# Patient Record
Sex: FEMALE | Race: WHITE | NOT HISPANIC OR LATINO | Employment: OTHER | URBAN - METROPOLITAN AREA
[De-identification: names, ages, dates, MRNs, and addresses within clinical notes are randomized per-mention and may not be internally consistent; named-entity substitution may affect disease eponyms.]

---

## 2023-06-22 ENCOUNTER — APPOINTMENT (OUTPATIENT)
Dept: PRIMARY CARE | Facility: CLINIC | Age: 83
End: 2023-06-22
Payer: COMMERCIAL

## 2023-07-13 ENCOUNTER — OFFICE VISIT (OUTPATIENT)
Dept: PRIMARY CARE | Facility: CLINIC | Age: 83
End: 2023-07-13
Payer: COMMERCIAL

## 2023-07-13 VITALS
HEIGHT: 61 IN | TEMPERATURE: 98 F | OXYGEN SATURATION: 96 % | HEART RATE: 84 BPM | DIASTOLIC BLOOD PRESSURE: 70 MMHG | BODY MASS INDEX: 45.31 KG/M2 | WEIGHT: 240 LBS | SYSTOLIC BLOOD PRESSURE: 115 MMHG

## 2023-07-13 DIAGNOSIS — I10 PRIMARY HYPERTENSION: ICD-10-CM

## 2023-07-13 DIAGNOSIS — E11.610 TYPE 2 DIABETES MELLITUS WITH DIABETIC NEUROPATHIC ARTHROPATHY, WITHOUT LONG-TERM CURRENT USE OF INSULIN (MULTI): ICD-10-CM

## 2023-07-13 DIAGNOSIS — E78.49 OTHER HYPERLIPIDEMIA: ICD-10-CM

## 2023-07-13 DIAGNOSIS — M19.90 ARTHRITIS: Primary | ICD-10-CM

## 2023-07-13 PROCEDURE — 99204 OFFICE O/P NEW MOD 45 MIN: CPT | Performed by: FAMILY MEDICINE

## 2023-07-13 PROCEDURE — 3074F SYST BP LT 130 MM HG: CPT | Performed by: FAMILY MEDICINE

## 2023-07-13 PROCEDURE — 1160F RVW MEDS BY RX/DR IN RCRD: CPT | Performed by: FAMILY MEDICINE

## 2023-07-13 PROCEDURE — 3078F DIAST BP <80 MM HG: CPT | Performed by: FAMILY MEDICINE

## 2023-07-13 PROCEDURE — 1036F TOBACCO NON-USER: CPT | Performed by: FAMILY MEDICINE

## 2023-07-13 PROCEDURE — 1159F MED LIST DOCD IN RCRD: CPT | Performed by: FAMILY MEDICINE

## 2023-07-13 RX ORDER — METOPROLOL SUCCINATE 100 MG/1
100 TABLET, EXTENDED RELEASE ORAL DAILY
Qty: 90 TABLET | Refills: 0 | Status: SHIPPED | OUTPATIENT
Start: 2023-07-13 | End: 2023-10-26

## 2023-07-13 RX ORDER — METOPROLOL SUCCINATE 100 MG/1
1 TABLET, EXTENDED RELEASE ORAL DAILY
COMMUNITY
Start: 2021-02-22 | End: 2023-07-13 | Stop reason: SDUPTHER

## 2023-07-13 RX ORDER — MIRTAZAPINE 15 MG/1
15 TABLET, FILM COATED ORAL NIGHTLY
Qty: 90 TABLET | Refills: 1 | Status: SHIPPED | OUTPATIENT
Start: 2023-07-13 | End: 2024-02-09 | Stop reason: SDUPTHER

## 2023-07-13 RX ORDER — MIRTAZAPINE 15 MG/1
TABLET, FILM COATED ORAL
COMMUNITY
Start: 2022-04-14 | End: 2023-07-13 | Stop reason: SDUPTHER

## 2023-07-13 RX ORDER — ENALAPRIL MALEATE AND HYDROCHLOROTHIAZIDE 10; 25 MG/1; MG/1
1 TABLET ORAL DAILY
COMMUNITY
End: 2023-07-13 | Stop reason: SDUPTHER

## 2023-07-13 RX ORDER — METFORMIN HYDROCHLORIDE 500 MG/1
TABLET ORAL
COMMUNITY
Start: 2016-02-25 | End: 2023-07-13 | Stop reason: SDUPTHER

## 2023-07-13 RX ORDER — METFORMIN HYDROCHLORIDE 500 MG/1
500 TABLET ORAL
Qty: 180 TABLET | Refills: 1 | Status: SHIPPED | OUTPATIENT
Start: 2023-07-13 | End: 2023-11-09 | Stop reason: ALTCHOICE

## 2023-07-13 RX ORDER — ENALAPRIL MALEATE AND HYDROCHLOROTHIAZIDE 10; 25 MG/1; MG/1
1 TABLET ORAL DAILY
Qty: 90 TABLET | Refills: 0 | Status: SHIPPED | OUTPATIENT
Start: 2023-07-13 | End: 2024-02-09 | Stop reason: WASHOUT

## 2023-07-13 RX ORDER — NAPROXEN 500 MG/1
500 TABLET ORAL DAILY
COMMUNITY
Start: 2023-04-25 | End: 2023-07-13 | Stop reason: SDUPTHER

## 2023-07-13 RX ORDER — ROSUVASTATIN CALCIUM 40 MG/1
40 TABLET, COATED ORAL DAILY
Qty: 90 TABLET | Refills: 1 | Status: SHIPPED | OUTPATIENT
Start: 2023-07-13 | End: 2023-11-09 | Stop reason: SDUPTHER

## 2023-07-13 RX ORDER — SERTRALINE HYDROCHLORIDE 50 MG/1
TABLET, FILM COATED ORAL
COMMUNITY
Start: 2022-04-14 | End: 2024-02-09 | Stop reason: SDUPTHER

## 2023-07-13 RX ORDER — ROSUVASTATIN CALCIUM 40 MG/1
40 TABLET, COATED ORAL DAILY
COMMUNITY
Start: 2023-04-25 | End: 2023-07-13 | Stop reason: SDUPTHER

## 2023-07-13 RX ORDER — NAPROXEN 500 MG/1
500 TABLET ORAL DAILY
Qty: 180 TABLET | Refills: 1 | Status: SHIPPED | OUTPATIENT
Start: 2023-07-13 | End: 2023-11-09 | Stop reason: ALTCHOICE

## 2023-07-13 ASSESSMENT — PATIENT HEALTH QUESTIONNAIRE - PHQ9
SUM OF ALL RESPONSES TO PHQ9 QUESTIONS 1 AND 2: 2
1. LITTLE INTEREST OR PLEASURE IN DOING THINGS: SEVERAL DAYS
2. FEELING DOWN, DEPRESSED OR HOPELESS: SEVERAL DAYS

## 2023-07-13 NOTE — PROGRESS NOTES
"Subjective   Patient ID: Vivienne Li is a 82 y.o. female who presents for New Patient Visit (New pt is here to establish care).    Needs meds refilled and incontinence supplies rx         Review of Systems   Reason unable to perform ROS: debility.       Objective   /70   Pulse 84   Temp 36.7 °C (98 °F)   Ht 1.549 m (5' 1\")   Wt 109 kg (240 lb)   SpO2 96%   BMI 45.35 kg/m²     Physical Exam  Vitals and nursing note reviewed.   Constitutional:       Appearance: She is obese.   HENT:      Head: Normocephalic and atraumatic.      Nose: Nose normal.      Mouth/Throat:      Mouth: Mucous membranes are moist.      Pharynx: Oropharynx is clear.   Eyes:      Extraocular Movements: Extraocular movements intact.      Conjunctiva/sclera: Conjunctivae normal.      Pupils: Pupils are equal, round, and reactive to light.   Cardiovascular:      Rate and Rhythm: Normal rate and regular rhythm.      Pulses: Normal pulses.   Pulmonary:      Effort: Pulmonary effort is normal.      Breath sounds: Normal breath sounds.   Abdominal:      General: Bowel sounds are normal.      Palpations: Abdomen is soft.   Musculoskeletal:         General: Tenderness present. Normal range of motion.      Cervical back: Normal range of motion and neck supple.   Skin:     General: Skin is warm and dry.      Capillary Refill: Capillary refill takes less than 2 seconds.      Coloration: Skin is pale.   Neurological:      Mental Status: She is alert. Mental status is at baseline.      Motor: Weakness present.      Gait: Gait abnormal.   Psychiatric:         Mood and Affect: Mood normal.         Behavior: Behavior normal.         Thought Content: Thought content normal.         Judgment: Judgment normal.         Assessment/Plan          "

## 2023-08-18 ENCOUNTER — TELEPHONE (OUTPATIENT)
Dept: PRIMARY CARE | Facility: CLINIC | Age: 83
End: 2023-08-18
Payer: COMMERCIAL

## 2023-08-18 NOTE — TELEPHONE ENCOUNTER
Spoke with  (mobile # listed) and she is asking what can be done about arthritis in knees. Pt has difficulty getting around and  is requesting either a cortisone shot or Prednisone prescription. Please advise.

## 2023-08-25 ENCOUNTER — OFFICE VISIT (OUTPATIENT)
Dept: PRIMARY CARE | Facility: CLINIC | Age: 83
End: 2023-08-25
Payer: COMMERCIAL

## 2023-08-25 VITALS
HEIGHT: 61 IN | DIASTOLIC BLOOD PRESSURE: 73 MMHG | OXYGEN SATURATION: 93 % | HEART RATE: 89 BPM | SYSTOLIC BLOOD PRESSURE: 115 MMHG | TEMPERATURE: 96.7 F | BODY MASS INDEX: 45.35 KG/M2

## 2023-08-25 DIAGNOSIS — Z79.4 TYPE 2 DIABETES MELLITUS WITH OTHER SPECIFIED COMPLICATION, WITH LONG-TERM CURRENT USE OF INSULIN (MULTI): Primary | ICD-10-CM

## 2023-08-25 DIAGNOSIS — R32 INCONTINENCE IN FEMALE: ICD-10-CM

## 2023-08-25 DIAGNOSIS — M17.0 LOCALIZED OSTEOARTHRITIS OF BOTH KNEES: ICD-10-CM

## 2023-08-25 DIAGNOSIS — E11.69 TYPE 2 DIABETES MELLITUS WITH OTHER SPECIFIED COMPLICATION, WITH LONG-TERM CURRENT USE OF INSULIN (MULTI): Primary | ICD-10-CM

## 2023-08-25 PROCEDURE — 1036F TOBACCO NON-USER: CPT | Performed by: FAMILY MEDICINE

## 2023-08-25 PROCEDURE — 1159F MED LIST DOCD IN RCRD: CPT | Performed by: FAMILY MEDICINE

## 2023-08-25 PROCEDURE — 3078F DIAST BP <80 MM HG: CPT | Performed by: FAMILY MEDICINE

## 2023-08-25 PROCEDURE — 3074F SYST BP LT 130 MM HG: CPT | Performed by: FAMILY MEDICINE

## 2023-08-25 PROCEDURE — 99214 OFFICE O/P EST MOD 30 MIN: CPT | Performed by: FAMILY MEDICINE

## 2023-08-25 PROCEDURE — 1160F RVW MEDS BY RX/DR IN RCRD: CPT | Performed by: FAMILY MEDICINE

## 2023-08-25 RX ORDER — METHYLPREDNISOLONE 4 MG/1
TABLET ORAL
Qty: 21 TABLET | Refills: 0 | Status: SHIPPED | OUTPATIENT
Start: 2023-08-25 | End: 2023-09-01

## 2023-08-25 ASSESSMENT — PATIENT HEALTH QUESTIONNAIRE - PHQ9
1. LITTLE INTEREST OR PLEASURE IN DOING THINGS: NOT AT ALL
SUM OF ALL RESPONSES TO PHQ9 QUESTIONS 1 & 2: 0
2. FEELING DOWN, DEPRESSED OR HOPELESS: NOT AT ALL

## 2023-08-25 ASSESSMENT — ENCOUNTER SYMPTOMS: ARTHRALGIAS: 1

## 2023-08-25 ASSESSMENT — COLUMBIA-SUICIDE SEVERITY RATING SCALE - C-SSRS
2. HAVE YOU ACTUALLY HAD ANY THOUGHTS OF KILLING YOURSELF?: NO
1. IN THE PAST MONTH, HAVE YOU WISHED YOU WERE DEAD OR WISHED YOU COULD GO TO SLEEP AND NOT WAKE UP?: NO
6. HAVE YOU EVER DONE ANYTHING, STARTED TO DO ANYTHING, OR PREPARED TO DO ANYTHING TO END YOUR LIFE?: NO

## 2023-08-25 NOTE — PROGRESS NOTES
"Subjective   Patient ID: Vivienne Li is a 82 y.o. female who presents for Knee Pain (Bilateral knee pain x chronic pain ).    Knee pain worse. End stage oa, diabetic, not a tkr candidate, WANTS PREDNISONE. REFERRED TO ORTHO AND ALSO TO ENDO FOR DM2 AND OBESITY TO EVAL FOR OZEMPIC.    Knee Pain          Review of Systems   Musculoskeletal:  Positive for arthralgias.   All other systems reviewed and are negative.      Objective   /73   Pulse 89   Temp 35.9 °C (96.7 °F)   Ht 1.549 m (5' 1\")   SpO2 93%   BMI 45.35 kg/m²     Physical Exam  Vitals and nursing note reviewed.   Constitutional:       Appearance: She is obese. She is ill-appearing.   HENT:      Head: Normocephalic and atraumatic.      Nose: Nose normal.      Mouth/Throat:      Mouth: Mucous membranes are moist.      Pharynx: Oropharynx is clear.   Eyes:      Extraocular Movements: Extraocular movements intact.      Conjunctiva/sclera: Conjunctivae normal.      Pupils: Pupils are equal, round, and reactive to light.   Cardiovascular:      Rate and Rhythm: Normal rate and regular rhythm.      Pulses: Normal pulses.   Pulmonary:      Effort: Pulmonary effort is normal.      Breath sounds: Normal breath sounds.   Abdominal:      General: Bowel sounds are normal.      Palpations: Abdomen is soft.   Musculoskeletal:         General: Tenderness present. Normal range of motion.      Cervical back: Normal range of motion and neck supple.   Skin:     General: Skin is warm and dry.      Capillary Refill: Capillary refill takes less than 2 seconds.   Neurological:      General: No focal deficit present.      Mental Status: She is alert.      Motor: Weakness present.      Gait: Gait abnormal.   Psychiatric:         Mood and Affect: Mood normal.         Behavior: Behavior normal.         Thought Content: Thought content normal.         Judgment: Judgment normal.         Assessment/Plan          "

## 2023-11-08 PROBLEM — H91.90 SUBJECTIVE HEARING CHANGE: Status: ACTIVE | Noted: 2023-11-08

## 2023-11-08 PROBLEM — I10 HTN (HYPERTENSION): Status: ACTIVE | Noted: 2023-11-08

## 2023-11-08 RX ORDER — NEOMYCIN SULFATE, POLYMYXIN B SULFATE AND HYDROCORTISONE 10; 3.5; 1 MG/ML; MG/ML; [USP'U]/ML
3 SUSPENSION/ DROPS AURICULAR (OTIC) 3 TIMES DAILY
COMMUNITY
Start: 2022-09-01 | End: 2024-02-09 | Stop reason: WASHOUT

## 2023-11-08 RX ORDER — LISINOPRIL AND HYDROCHLOROTHIAZIDE 12.5; 2 MG/1; MG/1
2 TABLET ORAL DAILY
COMMUNITY
Start: 2017-01-26 | End: 2023-11-09 | Stop reason: SDUPTHER

## 2023-11-08 RX ORDER — MECLIZINE HCL 12.5 MG 12.5 MG/1
25 TABLET ORAL 3 TIMES DAILY
COMMUNITY
Start: 2017-05-05 | End: 2024-02-09 | Stop reason: SDUPTHER

## 2023-11-08 RX ORDER — LISINOPRIL 40 MG/1
40 TABLET ORAL
COMMUNITY
Start: 2016-02-25 | End: 2024-02-09 | Stop reason: SDUPTHER

## 2023-11-08 RX ORDER — ROSUVASTATIN CALCIUM 20 MG/1
20 TABLET, COATED ORAL
COMMUNITY
Start: 2016-02-25 | End: 2023-11-09 | Stop reason: ALTCHOICE

## 2023-11-08 RX ORDER — METFORMIN HYDROCHLORIDE 1000 MG/1
1000 TABLET ORAL 2 TIMES DAILY
COMMUNITY
End: 2023-11-09 | Stop reason: SDUPTHER

## 2023-11-08 RX ORDER — TALC
3 POWDER (GRAM) TOPICAL
COMMUNITY
End: 2024-02-09 | Stop reason: SDUPTHER

## 2023-11-08 RX ORDER — AMLODIPINE BESYLATE 10 MG/1
10 TABLET ORAL
COMMUNITY
Start: 2016-12-30 | End: 2024-02-09 | Stop reason: SDUPTHER

## 2023-11-08 RX ORDER — HYDROCHLOROTHIAZIDE 25 MG/1
25 TABLET ORAL
COMMUNITY
Start: 2016-06-07

## 2023-11-08 RX ORDER — NAPROXEN 375 MG/1
1 TABLET ORAL 2 TIMES DAILY PRN
COMMUNITY
Start: 2016-06-07 | End: 2024-02-09 | Stop reason: SDUPTHER

## 2023-11-08 RX ORDER — HYDROCODONE BITARTRATE AND ACETAMINOPHEN 5; 325 MG/1; MG/1
1 TABLET ORAL EVERY 6 HOURS PRN
COMMUNITY
Start: 2016-11-29

## 2023-11-09 ENCOUNTER — TELEMEDICINE (OUTPATIENT)
Dept: PRIMARY CARE | Facility: CLINIC | Age: 83
End: 2023-11-09
Payer: COMMERCIAL

## 2023-11-09 DIAGNOSIS — K59.00 CONSTIPATION, UNSPECIFIED CONSTIPATION TYPE: Primary | ICD-10-CM

## 2023-11-09 DIAGNOSIS — E78.49 OTHER HYPERLIPIDEMIA: ICD-10-CM

## 2023-11-09 DIAGNOSIS — E11.43 TYPE 2 DIABETES MELLITUS WITH DIABETIC AUTONOMIC NEUROPATHY, WITHOUT LONG-TERM CURRENT USE OF INSULIN (MULTI): ICD-10-CM

## 2023-11-09 DIAGNOSIS — I10 PRIMARY HYPERTENSION: ICD-10-CM

## 2023-11-09 DIAGNOSIS — M19.90 ARTHRITIS: ICD-10-CM

## 2023-11-09 PROCEDURE — 99214 OFFICE O/P EST MOD 30 MIN: CPT | Performed by: FAMILY MEDICINE

## 2023-11-09 RX ORDER — METOPROLOL SUCCINATE 100 MG/1
100 TABLET, EXTENDED RELEASE ORAL DAILY
Qty: 90 TABLET | Refills: 0 | Status: SHIPPED | OUTPATIENT
Start: 2023-11-09 | End: 2024-02-09 | Stop reason: SDUPTHER

## 2023-11-09 RX ORDER — POLYETHYLENE GLYCOL 3350 17 G/17G
8.5 POWDER, FOR SOLUTION ORAL DAILY
Qty: 45 PACKET | Refills: 1 | Status: SHIPPED | OUTPATIENT
Start: 2023-11-09 | End: 2024-02-09 | Stop reason: SDUPTHER

## 2023-11-09 RX ORDER — ROSUVASTATIN CALCIUM 40 MG/1
40 TABLET, COATED ORAL DAILY
Qty: 90 TABLET | Refills: 1 | Status: SHIPPED | OUTPATIENT
Start: 2023-11-09 | End: 2024-02-09 | Stop reason: SDUPTHER

## 2023-11-09 RX ORDER — LISINOPRIL AND HYDROCHLOROTHIAZIDE 12.5; 2 MG/1; MG/1
2 TABLET ORAL DAILY
Qty: 180 TABLET | Refills: 1 | Status: SHIPPED | OUTPATIENT
Start: 2023-11-09 | End: 2024-02-09 | Stop reason: SDUPTHER

## 2023-11-09 RX ORDER — METFORMIN HYDROCHLORIDE 1000 MG/1
1000 TABLET ORAL 2 TIMES DAILY
Qty: 180 TABLET | Refills: 1 | Status: SHIPPED | OUTPATIENT
Start: 2023-11-09 | End: 2024-02-09 | Stop reason: SDUPTHER

## 2023-11-09 NOTE — PROGRESS NOTES
Subjective   Patient ID: Vivienne Li is a 83 y.o. female who presents for No chief complaint on file..    Virtual visit. Constipation, requests miralax, htn, dm2, hld,  needs meds refilled.  Arthritis worse, needs higher naprosyn dose.         Review of Systems   Reason unable to perform ROS: debiliy.       Objective   There were no vitals taken for this visit.    Physical Exam    Assessment/Plan

## 2024-02-09 ENCOUNTER — TELEMEDICINE (OUTPATIENT)
Dept: PRIMARY CARE | Facility: CLINIC | Age: 84
End: 2024-02-09
Payer: COMMERCIAL

## 2024-02-09 ENCOUNTER — TELEPHONE (OUTPATIENT)
Dept: PRIMARY CARE | Facility: CLINIC | Age: 84
End: 2024-02-09

## 2024-02-09 DIAGNOSIS — E78.49 OTHER HYPERLIPIDEMIA: ICD-10-CM

## 2024-02-09 DIAGNOSIS — K59.00 CONSTIPATION, UNSPECIFIED CONSTIPATION TYPE: ICD-10-CM

## 2024-02-09 DIAGNOSIS — E11.610 TYPE 2 DIABETES MELLITUS WITH DIABETIC NEUROPATHIC ARTHROPATHY, WITHOUT LONG-TERM CURRENT USE OF INSULIN (MULTI): ICD-10-CM

## 2024-02-09 DIAGNOSIS — E11.43 TYPE 2 DIABETES MELLITUS WITH DIABETIC AUTONOMIC NEUROPATHY, WITHOUT LONG-TERM CURRENT USE OF INSULIN (MULTI): ICD-10-CM

## 2024-02-09 DIAGNOSIS — M19.90 ARTHRITIS: Primary | ICD-10-CM

## 2024-02-09 DIAGNOSIS — I10 PRIMARY HYPERTENSION: ICD-10-CM

## 2024-02-09 DIAGNOSIS — R32 INCONTINENCE IN FEMALE: ICD-10-CM

## 2024-02-09 PROCEDURE — 99214 OFFICE O/P EST MOD 30 MIN: CPT | Performed by: FAMILY MEDICINE

## 2024-02-09 PROCEDURE — 1036F TOBACCO NON-USER: CPT | Performed by: FAMILY MEDICINE

## 2024-02-09 RX ORDER — LISINOPRIL AND HYDROCHLOROTHIAZIDE 12.5; 2 MG/1; MG/1
2 TABLET ORAL DAILY
Qty: 180 TABLET | Refills: 3 | Status: SHIPPED | OUTPATIENT
Start: 2024-02-09 | End: 2025-02-03

## 2024-02-09 RX ORDER — SERTRALINE HYDROCHLORIDE 50 MG/1
50 TABLET, FILM COATED ORAL DAILY
Qty: 90 TABLET | Refills: 3 | Status: SHIPPED | OUTPATIENT
Start: 2024-02-09 | End: 2025-02-03

## 2024-02-09 RX ORDER — PREDNISONE 10 MG/1
TABLET ORAL
Qty: 42 TABLET | Refills: 0 | Status: SHIPPED | OUTPATIENT
Start: 2024-02-09 | End: 2024-02-21

## 2024-02-09 RX ORDER — AMLODIPINE BESYLATE 10 MG/1
10 TABLET ORAL
Qty: 90 TABLET | Refills: 3 | Status: SHIPPED | OUTPATIENT
Start: 2024-02-09 | End: 2024-02-09 | Stop reason: SDUPTHER

## 2024-02-09 RX ORDER — NAPROXEN 375 MG/1
375 TABLET ORAL 2 TIMES DAILY PRN
Qty: 180 TABLET | Refills: 3 | Status: SHIPPED | OUTPATIENT
Start: 2024-02-09 | End: 2025-02-03

## 2024-02-09 RX ORDER — MIRTAZAPINE 15 MG/1
15 TABLET, FILM COATED ORAL NIGHTLY
Qty: 90 TABLET | Refills: 1 | Status: SHIPPED | OUTPATIENT
Start: 2024-02-09 | End: 2024-08-07

## 2024-02-09 RX ORDER — METFORMIN HYDROCHLORIDE 1000 MG/1
1000 TABLET ORAL 2 TIMES DAILY
Qty: 180 TABLET | Refills: 3 | Status: SHIPPED | OUTPATIENT
Start: 2024-02-09 | End: 2025-02-03

## 2024-02-09 RX ORDER — LISINOPRIL 40 MG/1
40 TABLET ORAL
Qty: 90 TABLET | Refills: 3 | Status: SHIPPED | OUTPATIENT
Start: 2024-02-09 | End: 2025-02-03

## 2024-02-09 RX ORDER — ROSUVASTATIN CALCIUM 40 MG/1
40 TABLET, COATED ORAL DAILY
Qty: 90 TABLET | Refills: 3 | Status: SHIPPED | OUTPATIENT
Start: 2024-02-09 | End: 2025-02-03

## 2024-02-09 RX ORDER — AMLODIPINE BESYLATE 10 MG/1
10 TABLET ORAL
Qty: 90 TABLET | Refills: 3 | Status: SHIPPED | OUTPATIENT
Start: 2024-02-09 | End: 2025-02-03

## 2024-02-09 RX ORDER — MECLIZINE HCL 12.5 MG 12.5 MG/1
25 TABLET ORAL 3 TIMES DAILY PRN
Qty: 270 TABLET | Refills: 3 | Status: SHIPPED | OUTPATIENT
Start: 2024-02-09 | End: 2025-02-03

## 2024-02-09 RX ORDER — TALC
3 POWDER (GRAM) TOPICAL NIGHTLY PRN
Qty: 90 TABLET | Refills: 3 | Status: SHIPPED | OUTPATIENT
Start: 2024-02-09 | End: 2025-02-03

## 2024-02-09 RX ORDER — METOPROLOL SUCCINATE 100 MG/1
100 TABLET, EXTENDED RELEASE ORAL DAILY
Qty: 90 TABLET | Refills: 3 | Status: SHIPPED | OUTPATIENT
Start: 2024-02-09 | End: 2025-02-03

## 2024-02-09 RX ORDER — POLYETHYLENE GLYCOL 3350 17 G/17G
8.5 POWDER, FOR SOLUTION ORAL DAILY
Qty: 45 PACKET | Refills: 1 | Status: SHIPPED | OUTPATIENT
Start: 2024-02-09 | End: 2024-08-07

## 2024-02-09 ASSESSMENT — ENCOUNTER SYMPTOMS
NECK STIFFNESS: 1
BACK PAIN: 1
ARTHRALGIAS: 1
MYALGIAS: 1
NECK PAIN: 1
JOINT SWELLING: 1

## 2024-02-09 NOTE — PROGRESS NOTES
Subjective   Patient ID: Vivienne Li is a 83 y.o. female who presents for Follow-up (3 months-virtual visit).    Virtual visit. Here for meds refill. Arthritis acting up, requests prednisone taper pack.         Review of Systems   Musculoskeletal:  Positive for arthralgias, back pain, gait problem, joint swelling, myalgias, neck pain and neck stiffness.   All other systems reviewed and are negative.      Objective   There were no vitals taken for this visit.    Physical Exam    Assessment/Plan

## 2024-02-09 NOTE — TELEPHONE ENCOUNTER
Pharmacist called to double check on prescriptions that were sent in today. There is one for Lisinopril 40 MG and also one Lisinopril-Hydrochlorothiazide 20-12.5 MG.

## 2024-02-13 NOTE — TELEPHONE ENCOUNTER
Called and told the pharmacy. They will fill the Lisinopril-Hydrochlorothiazide 20-12.5 MG, but patient had already picked up Lisinopril 40 MG. Called  to tell patient not to take Lisinopril and take the combined medication instead.

## 2024-06-14 ENCOUNTER — TELEMEDICINE (OUTPATIENT)
Dept: PRIMARY CARE | Facility: CLINIC | Age: 84
End: 2024-06-14
Payer: COMMERCIAL

## 2024-06-14 DIAGNOSIS — M19.90 ARTHRITIS: ICD-10-CM

## 2024-06-14 DIAGNOSIS — E78.49 OTHER HYPERLIPIDEMIA: ICD-10-CM

## 2024-06-14 DIAGNOSIS — I10 PRIMARY HYPERTENSION: Primary | ICD-10-CM

## 2024-06-14 DIAGNOSIS — E11.43 TYPE 2 DIABETES MELLITUS WITH DIABETIC AUTONOMIC NEUROPATHY, WITHOUT LONG-TERM CURRENT USE OF INSULIN (MULTI): ICD-10-CM

## 2024-06-14 DIAGNOSIS — E11.610 TYPE 2 DIABETES MELLITUS WITH DIABETIC NEUROPATHIC ARTHROPATHY, WITHOUT LONG-TERM CURRENT USE OF INSULIN (MULTI): ICD-10-CM

## 2024-06-14 PROCEDURE — 99214 OFFICE O/P EST MOD 30 MIN: CPT | Performed by: FAMILY MEDICINE

## 2024-06-14 RX ORDER — MECLIZINE HCL 12.5 MG 12.5 MG/1
25 TABLET ORAL 3 TIMES DAILY PRN
Qty: 270 TABLET | Refills: 3 | Status: ON HOLD | OUTPATIENT
Start: 2024-06-14 | End: 2025-06-09

## 2024-06-14 RX ORDER — METOPROLOL SUCCINATE 100 MG/1
100 TABLET, EXTENDED RELEASE ORAL DAILY
Qty: 90 TABLET | Refills: 3 | Status: ON HOLD | OUTPATIENT
Start: 2024-06-14 | End: 2025-06-09

## 2024-06-14 RX ORDER — MIRTAZAPINE 15 MG/1
15 TABLET, FILM COATED ORAL NIGHTLY
Qty: 90 TABLET | Refills: 1 | Status: ON HOLD | OUTPATIENT
Start: 2024-06-14 | End: 2024-12-11

## 2024-06-14 RX ORDER — NAPROXEN 375 MG/1
375 TABLET ORAL 2 TIMES DAILY PRN
Qty: 180 TABLET | Refills: 3 | Status: ON HOLD | OUTPATIENT
Start: 2024-06-14 | End: 2025-06-09

## 2024-06-14 RX ORDER — ROSUVASTATIN CALCIUM 40 MG/1
40 TABLET, COATED ORAL DAILY
Qty: 90 TABLET | Refills: 3 | Status: ON HOLD | OUTPATIENT
Start: 2024-06-14 | End: 2025-06-09

## 2024-06-14 RX ORDER — METHYLPREDNISOLONE 4 MG/1
TABLET ORAL
Qty: 21 TABLET | Refills: 0 | Status: ON HOLD | OUTPATIENT
Start: 2024-06-14 | End: 2024-06-21

## 2024-06-14 RX ORDER — HYDROCHLOROTHIAZIDE 25 MG/1
25 TABLET ORAL
Qty: 90 TABLET | Refills: 3 | Status: ON HOLD | OUTPATIENT
Start: 2024-06-14 | End: 2025-06-09

## 2024-06-14 RX ORDER — METFORMIN HYDROCHLORIDE 1000 MG/1
1000 TABLET ORAL 2 TIMES DAILY
Qty: 180 TABLET | Refills: 3 | Status: ON HOLD | OUTPATIENT
Start: 2024-06-14 | End: 2025-06-09

## 2024-06-14 RX ORDER — HYDROCODONE BITARTRATE AND ACETAMINOPHEN 5; 325 MG/1; MG/1
1 TABLET ORAL EVERY 6 HOURS PRN
Qty: 120 TABLET | Refills: 0 | Status: ON HOLD | OUTPATIENT
Start: 2024-06-14 | End: 2024-07-14

## 2024-06-14 RX ORDER — ENALAPRIL MALEATE 10 MG/1
10 TABLET ORAL DAILY
Qty: 90 TABLET | Refills: 3 | Status: ON HOLD | OUTPATIENT
Start: 2024-06-14 | End: 2025-06-14

## 2024-06-14 NOTE — PROGRESS NOTES
Subjective   Patient ID: Vivienne Li is a 83 y.o. female who presents for Follow-up.    Virtual visit. Arthritis acting up, requests a medrol dose pack. Wants to return to enalapril hydrochlorothiazide for htn as lisinopril isn't controlling her bp. Needs all meds refilled.          Review of Systems   Reason unable to perform ROS: debility.   [place]AR[place]:  Peng Velez MD on 6/14/2024 10:01 AM  I have personally reviewed the OARRS report for Vivienne Li. I have considered the risks of abuse, dependence, addiction and diversion    Is the patient prescribed a combination of a benzodiazepine and opioid?  Yes, I feel it is clincially indicated to continue the medication and have discussed with the patient risks/benefits/alternatives.    Last Urine Drug Screen / ordered today: No  No results found for this or any previous visit (from the past 8760 hour(s)).  N/A    Clinical rationale for not completing a Urine Drug Screen: virtual visit      Controlled Substance Agreement:  Date of the Last Agreement: current  Reviewed Controlled Substance Agreement including but not limited to the benefits, risks, and alternatives to treatment with a Controlled Substance medication(s).    Opioids:  What is the patient's goal of therapy? Pain relief  Is this being achieved with current treatment? yes    I have calculated the patient's Morphine Dose Equivalent (MED):   I have considered referral to Pain Management and/or a specialist, and do not feel it is necessary at this time.    I feel that it is clinically indicated to continue this current medication regimen after consideration of alternative therapies, and other non-opioid treatment.    Opioid Risk Screening:  No data recorded    Pain Assessment:  No data recorded    Objective   There were no vitals taken for this visit.    Physical Exam    Assessment/Plan   Problem List Items Addressed This Visit             ICD-10-CM    HTN (hypertension) - Primary I10    Relevant Medications     metoprolol succinate XL (Toprol-XL) 100 mg 24 hr tablet    hydroCHLOROthiazide (HYDRODiuril) 25 mg tablet    naproxen (Naprosyn) 375 mg tablet    enalapril (Vasotec) 10 mg tablet    Type 2 diabetes mellitus (Multi) E11.9    Relevant Medications    metFORMIN (Glucophage) 1,000 mg tablet    mirtazapine (Remeron) 15 mg tablet    meclizine (Antivert) 12.5 mg tablet     Other Visit Diagnoses         Codes    Other hyperlipidemia     E78.49    Relevant Medications    meclizine (Antivert) 12.5 mg tablet    naproxen (Naprosyn) 375 mg tablet    rosuvastatin (Crestor) 40 mg tablet    Arthritis     M19.90    Relevant Medications    HYDROcodone-acetaminophen (Norco) 5-325 mg tablet    methylPREDNISolone (Medrol Dospak) 4 mg tablets

## 2024-06-16 ENCOUNTER — APPOINTMENT (OUTPATIENT)
Dept: RADIOLOGY | Facility: HOSPITAL | Age: 84
DRG: 378 | End: 2024-06-16
Payer: COMMERCIAL

## 2024-06-16 ENCOUNTER — APPOINTMENT (OUTPATIENT)
Dept: CARDIOLOGY | Facility: HOSPITAL | Age: 84
DRG: 378 | End: 2024-06-16
Payer: COMMERCIAL

## 2024-06-16 ENCOUNTER — HOSPITAL ENCOUNTER (INPATIENT)
Facility: HOSPITAL | Age: 84
DRG: 378 | End: 2024-06-16
Attending: STUDENT IN AN ORGANIZED HEALTH CARE EDUCATION/TRAINING PROGRAM | Admitting: INTERNAL MEDICINE
Payer: COMMERCIAL

## 2024-06-16 VITALS
DIASTOLIC BLOOD PRESSURE: 58 MMHG | TEMPERATURE: 98.1 F | RESPIRATION RATE: 25 BRPM | SYSTOLIC BLOOD PRESSURE: 132 MMHG | HEART RATE: 80 BPM | OXYGEN SATURATION: 97 %

## 2024-06-16 DIAGNOSIS — I95.9 HYPOTENSION, UNSPECIFIED HYPOTENSION TYPE: ICD-10-CM

## 2024-06-16 DIAGNOSIS — R91.1 LUNG NODULE: ICD-10-CM

## 2024-06-16 DIAGNOSIS — K25.9 MULTIPLE GASTRIC ULCERS: ICD-10-CM

## 2024-06-16 DIAGNOSIS — K22.10: ICD-10-CM

## 2024-06-16 DIAGNOSIS — K92.0 HEMATEMESIS, UNSPECIFIED WHETHER NAUSEA PRESENT: Primary | ICD-10-CM

## 2024-06-16 DIAGNOSIS — D50.0 IRON DEFICIENCY ANEMIA DUE TO CHRONIC BLOOD LOSS: ICD-10-CM

## 2024-06-16 DIAGNOSIS — M25.569 ARTHRALGIA OF KNEE, UNSPECIFIED LATERALITY: ICD-10-CM

## 2024-06-16 LAB
ABO GROUP (TYPE) IN BLOOD: NORMAL
ABO GROUP (TYPE) IN BLOOD: NORMAL
ALBUMIN SERPL BCP-MCNC: 3.1 G/DL (ref 3.4–5)
ALP SERPL-CCNC: 47 U/L (ref 33–136)
ALT SERPL W P-5'-P-CCNC: 5 U/L (ref 7–45)
ANION GAP SERPL CALC-SCNC: 12 MMOL/L (ref 10–20)
ANTIBODY SCREEN: NORMAL
APTT PPP: 30 SECONDS (ref 27–38)
AST SERPL W P-5'-P-CCNC: 10 U/L (ref 9–39)
BASOPHILS # BLD AUTO: 0.02 X10*3/UL (ref 0–0.1)
BASOPHILS NFR BLD AUTO: 0.2 %
BILIRUB SERPL-MCNC: 0.2 MG/DL (ref 0–1.2)
BLOOD EXPIRATION DATE: NORMAL
BUN SERPL-MCNC: 83 MG/DL (ref 6–23)
CALCIUM SERPL-MCNC: 8.5 MG/DL (ref 8.6–10.3)
CARDIAC TROPONIN I PNL SERPL HS: 15 NG/L (ref 0–13)
CARDIAC TROPONIN I PNL SERPL HS: 17 NG/L (ref 0–13)
CHLORIDE SERPL-SCNC: 112 MMOL/L (ref 98–107)
CO2 SERPL-SCNC: 20 MMOL/L (ref 21–32)
CREAT SERPL-MCNC: 1.58 MG/DL (ref 0.5–1.05)
DACRYOCYTES BLD QL SMEAR: NORMAL
DISPENSE STATUS: NORMAL
EGFRCR SERPLBLD CKD-EPI 2021: 32 ML/MIN/1.73M*2
EOSINOPHIL # BLD AUTO: 0.25 X10*3/UL (ref 0–0.4)
EOSINOPHIL NFR BLD AUTO: 2.4 %
ERYTHROCYTE [DISTWIDTH] IN BLOOD BY AUTOMATED COUNT: 14.9 % (ref 11.5–14.5)
GLUCOSE BLD MANUAL STRIP-MCNC: 102 MG/DL (ref 74–99)
GLUCOSE BLD MANUAL STRIP-MCNC: 107 MG/DL (ref 74–99)
GLUCOSE SERPL-MCNC: 159 MG/DL (ref 74–99)
HCT VFR BLD AUTO: 14.7 % (ref 36–46)
HGB BLD-MCNC: 4.5 G/DL (ref 12–16)
IMM GRANULOCYTES # BLD AUTO: 0.06 X10*3/UL (ref 0–0.5)
IMM GRANULOCYTES NFR BLD AUTO: 0.6 % (ref 0–0.9)
INR PPP: 1.2 (ref 0.9–1.1)
LIPASE SERPL-CCNC: 37 U/L (ref 9–82)
LYMPHOCYTES # BLD AUTO: 2.29 X10*3/UL (ref 0.8–3)
LYMPHOCYTES NFR BLD AUTO: 22.4 %
MCH RBC QN AUTO: 26.9 PG (ref 26–34)
MCHC RBC AUTO-ENTMCNC: 30.6 G/DL (ref 32–36)
MCV RBC AUTO: 88 FL (ref 80–100)
MONOCYTES # BLD AUTO: 0.84 X10*3/UL (ref 0.05–0.8)
MONOCYTES NFR BLD AUTO: 8.2 %
NEUTROPHILS # BLD AUTO: 6.76 X10*3/UL (ref 1.6–5.5)
NEUTROPHILS NFR BLD AUTO: 66.2 %
NRBC BLD-RTO: 0 /100 WBCS (ref 0–0)
OVALOCYTES BLD QL SMEAR: NORMAL
PLATELET # BLD AUTO: 249 X10*3/UL (ref 150–450)
POLYCHROMASIA BLD QL SMEAR: NORMAL
POTASSIUM SERPL-SCNC: 4.8 MMOL/L (ref 3.5–5.3)
PRODUCT BLOOD TYPE: 5100
PRODUCT CODE: NORMAL
PROT SERPL-MCNC: 5.5 G/DL (ref 6.4–8.2)
PROTHROMBIN TIME: 13 SECONDS (ref 9.8–12.8)
RBC # BLD AUTO: 1.67 X10*6/UL (ref 4–5.2)
RBC MORPH BLD: NORMAL
RH FACTOR (ANTIGEN D): NORMAL
RH FACTOR (ANTIGEN D): NORMAL
SODIUM SERPL-SCNC: 139 MMOL/L (ref 136–145)
UNIT ABO: NORMAL
UNIT NUMBER: NORMAL
UNIT RH: NORMAL
UNIT VOLUME: 266
UNIT VOLUME: 267
UNIT VOLUME: 350
WBC # BLD AUTO: 10.2 X10*3/UL (ref 4.4–11.3)
XM INTEP: NORMAL

## 2024-06-16 PROCEDURE — P9016 RBC LEUKOCYTES REDUCED: HCPCS

## 2024-06-16 PROCEDURE — 71275 CT ANGIOGRAPHY CHEST: CPT | Performed by: RADIOLOGY

## 2024-06-16 PROCEDURE — 36415 COLL VENOUS BLD VENIPUNCTURE: CPT | Performed by: STUDENT IN AN ORGANIZED HEALTH CARE EDUCATION/TRAINING PROGRAM

## 2024-06-16 PROCEDURE — 84484 ASSAY OF TROPONIN QUANT: CPT | Performed by: STUDENT IN AN ORGANIZED HEALTH CARE EDUCATION/TRAINING PROGRAM

## 2024-06-16 PROCEDURE — 86901 BLOOD TYPING SEROLOGIC RH(D): CPT | Performed by: STUDENT IN AN ORGANIZED HEALTH CARE EDUCATION/TRAINING PROGRAM

## 2024-06-16 PROCEDURE — 71275 CT ANGIOGRAPHY CHEST: CPT

## 2024-06-16 PROCEDURE — C9113 INJ PANTOPRAZOLE SODIUM, VIA: HCPCS | Performed by: STUDENT IN AN ORGANIZED HEALTH CARE EDUCATION/TRAINING PROGRAM

## 2024-06-16 PROCEDURE — 85610 PROTHROMBIN TIME: CPT | Performed by: STUDENT IN AN ORGANIZED HEALTH CARE EDUCATION/TRAINING PROGRAM

## 2024-06-16 PROCEDURE — 99223 1ST HOSP IP/OBS HIGH 75: CPT | Performed by: INTERNAL MEDICINE

## 2024-06-16 PROCEDURE — 2500000004 HC RX 250 GENERAL PHARMACY W/ HCPCS (ALT 636 FOR OP/ED): Performed by: STUDENT IN AN ORGANIZED HEALTH CARE EDUCATION/TRAINING PROGRAM

## 2024-06-16 PROCEDURE — 82947 ASSAY GLUCOSE BLOOD QUANT: CPT

## 2024-06-16 PROCEDURE — 74174 CTA ABD&PLVS W/CONTRAST: CPT | Performed by: RADIOLOGY

## 2024-06-16 PROCEDURE — 2550000001 HC RX 255 CONTRASTS: Performed by: STUDENT IN AN ORGANIZED HEALTH CARE EDUCATION/TRAINING PROGRAM

## 2024-06-16 PROCEDURE — 96374 THER/PROPH/DIAG INJ IV PUSH: CPT

## 2024-06-16 PROCEDURE — 83690 ASSAY OF LIPASE: CPT | Performed by: STUDENT IN AN ORGANIZED HEALTH CARE EDUCATION/TRAINING PROGRAM

## 2024-06-16 PROCEDURE — 99291 CRITICAL CARE FIRST HOUR: CPT | Performed by: STUDENT IN AN ORGANIZED HEALTH CARE EDUCATION/TRAINING PROGRAM

## 2024-06-16 PROCEDURE — 36430 TRANSFUSION BLD/BLD COMPNT: CPT

## 2024-06-16 PROCEDURE — 86920 COMPATIBILITY TEST SPIN: CPT

## 2024-06-16 PROCEDURE — 2060000001 HC INTERMEDIATE ICU ROOM DAILY

## 2024-06-16 PROCEDURE — 96375 TX/PRO/DX INJ NEW DRUG ADDON: CPT

## 2024-06-16 PROCEDURE — 96361 HYDRATE IV INFUSION ADD-ON: CPT

## 2024-06-16 PROCEDURE — 85025 COMPLETE CBC W/AUTO DIFF WBC: CPT | Performed by: STUDENT IN AN ORGANIZED HEALTH CARE EDUCATION/TRAINING PROGRAM

## 2024-06-16 PROCEDURE — 93005 ELECTROCARDIOGRAM TRACING: CPT

## 2024-06-16 PROCEDURE — 80053 COMPREHEN METABOLIC PANEL: CPT | Performed by: STUDENT IN AN ORGANIZED HEALTH CARE EDUCATION/TRAINING PROGRAM

## 2024-06-16 RX ORDER — ONDANSETRON HYDROCHLORIDE 2 MG/ML
4 INJECTION, SOLUTION INTRAVENOUS ONCE
Status: COMPLETED | OUTPATIENT
Start: 2024-06-16 | End: 2024-06-16

## 2024-06-16 RX ORDER — ASPIRIN 81 MG/1
1 TABLET ORAL DAILY
COMMUNITY

## 2024-06-16 RX ORDER — DEXTROSE 50 % IN WATER (D50W) INTRAVENOUS SYRINGE
12.5
Status: DISCONTINUED | OUTPATIENT
Start: 2024-06-16 | End: 2024-06-19 | Stop reason: HOSPADM

## 2024-06-16 RX ORDER — ROSUVASTATIN CALCIUM 10 MG/1
40 TABLET, COATED ORAL DAILY
Status: DISCONTINUED | OUTPATIENT
Start: 2024-06-17 | End: 2024-06-19 | Stop reason: HOSPADM

## 2024-06-16 RX ORDER — SERTRALINE HYDROCHLORIDE 50 MG/1
50 TABLET, FILM COATED ORAL DAILY
Status: DISCONTINUED | OUTPATIENT
Start: 2024-06-16 | End: 2024-06-19 | Stop reason: HOSPADM

## 2024-06-16 RX ORDER — DEXTROSE 50 % IN WATER (D50W) INTRAVENOUS SYRINGE
25
Status: DISCONTINUED | OUTPATIENT
Start: 2024-06-16 | End: 2024-06-19 | Stop reason: HOSPADM

## 2024-06-16 RX ORDER — MIRTAZAPINE 15 MG/1
15 TABLET, FILM COATED ORAL NIGHTLY
Status: DISCONTINUED | OUTPATIENT
Start: 2024-06-16 | End: 2024-06-19 | Stop reason: HOSPADM

## 2024-06-16 RX ORDER — TALC
3 POWDER (GRAM) TOPICAL NIGHTLY PRN
Status: DISCONTINUED | OUTPATIENT
Start: 2024-06-16 | End: 2024-06-16

## 2024-06-16 RX ORDER — ACETAMINOPHEN 500 MG
5 TABLET ORAL NIGHTLY PRN
Status: DISCONTINUED | OUTPATIENT
Start: 2024-06-16 | End: 2024-06-19 | Stop reason: HOSPADM

## 2024-06-16 RX ORDER — ACETAMINOPHEN 650 MG/1
650 SUPPOSITORY RECTAL EVERY 4 HOURS PRN
Status: DISCONTINUED | OUTPATIENT
Start: 2024-06-16 | End: 2024-06-19 | Stop reason: HOSPADM

## 2024-06-16 RX ORDER — ACETAMINOPHEN 160 MG/5ML
650 SOLUTION ORAL EVERY 4 HOURS PRN
Status: DISCONTINUED | OUTPATIENT
Start: 2024-06-16 | End: 2024-06-19 | Stop reason: HOSPADM

## 2024-06-16 RX ORDER — ACETAMINOPHEN 500 MG
5 TABLET ORAL NIGHTLY
Status: DISCONTINUED | OUTPATIENT
Start: 2024-06-16 | End: 2024-06-16

## 2024-06-16 RX ORDER — ACETAMINOPHEN 325 MG/1
650 TABLET ORAL EVERY 4 HOURS PRN
Status: DISCONTINUED | OUTPATIENT
Start: 2024-06-16 | End: 2024-06-19 | Stop reason: HOSPADM

## 2024-06-16 RX ORDER — PANTOPRAZOLE SODIUM 40 MG/10ML
80 INJECTION, POWDER, LYOPHILIZED, FOR SOLUTION INTRAVENOUS ONCE
Status: COMPLETED | OUTPATIENT
Start: 2024-06-16 | End: 2024-06-16

## 2024-06-16 RX ORDER — INSULIN LISPRO 100 [IU]/ML
0-5 INJECTION, SOLUTION INTRAVENOUS; SUBCUTANEOUS EVERY 4 HOURS
Status: DISCONTINUED | OUTPATIENT
Start: 2024-06-16 | End: 2024-06-19 | Stop reason: HOSPADM

## 2024-06-16 RX ADMIN — IOHEXOL 90 ML: 350 INJECTION, SOLUTION INTRAVENOUS at 16:51

## 2024-06-16 RX ADMIN — ONDANSETRON 4 MG: 2 INJECTION INTRAMUSCULAR; INTRAVENOUS at 13:04

## 2024-06-16 RX ADMIN — PANTOPRAZOLE SODIUM 80 MG: 40 INJECTION, POWDER, FOR SOLUTION INTRAVENOUS at 14:01

## 2024-06-16 RX ADMIN — SODIUM CHLORIDE, POTASSIUM CHLORIDE, SODIUM LACTATE AND CALCIUM CHLORIDE 1000 ML: 600; 310; 30; 20 INJECTION, SOLUTION INTRAVENOUS at 13:02

## 2024-06-16 RX ADMIN — PANTOPRAZOLE SODIUM 8 MG/HR: 40 INJECTION, POWDER, FOR SOLUTION INTRAVENOUS at 18:53

## 2024-06-16 SDOH — SOCIAL STABILITY: SOCIAL INSECURITY: DOES ANYONE TRY TO KEEP YOU FROM HAVING/CONTACTING OTHER FRIENDS OR DOING THINGS OUTSIDE YOUR HOME?: UNABLE TO ASSESS

## 2024-06-16 SDOH — ECONOMIC STABILITY: INCOME INSECURITY: IN THE LAST 12 MONTHS, WAS THERE A TIME WHEN YOU WERE NOT ABLE TO PAY THE MORTGAGE OR RENT ON TIME?: NO

## 2024-06-16 SDOH — HEALTH STABILITY: MENTAL HEALTH: HOW OFTEN DO YOU HAVE 6 OR MORE DRINKS ON ONE OCCASION?: NEVER

## 2024-06-16 SDOH — ECONOMIC STABILITY: HOUSING INSECURITY
IN THE LAST 12 MONTHS, WAS THERE A TIME WHEN YOU DID NOT HAVE A STEADY PLACE TO SLEEP OR SLEPT IN A SHELTER (INCLUDING NOW)?: NO

## 2024-06-16 SDOH — HEALTH STABILITY: MENTAL HEALTH: HOW OFTEN DO YOU HAVE A DRINK CONTAINING ALCOHOL?: NEVER

## 2024-06-16 SDOH — SOCIAL STABILITY: SOCIAL INSECURITY: HAS ANYONE EVER THREATENED TO HURT YOUR FAMILY OR YOUR PETS?: UNABLE TO ASSESS

## 2024-06-16 SDOH — SOCIAL STABILITY: SOCIAL INSECURITY: ARE YOU OR HAVE YOU BEEN THREATENED OR ABUSED PHYSICALLY, EMOTIONALLY, OR SEXUALLY BY ANYONE?: UNABLE TO ASSESS

## 2024-06-16 SDOH — SOCIAL STABILITY: SOCIAL INSECURITY: DO YOU FEEL ANYONE HAS EXPLOITED OR TAKEN ADVANTAGE OF YOU FINANCIALLY OR OF YOUR PERSONAL PROPERTY?: UNABLE TO ASSESS

## 2024-06-16 SDOH — SOCIAL STABILITY: SOCIAL INSECURITY: HAVE YOU HAD ANY THOUGHTS OF HARMING ANYONE ELSE?: UNABLE TO ASSESS

## 2024-06-16 SDOH — ECONOMIC STABILITY: HOUSING INSECURITY: IN THE LAST 12 MONTHS, HOW MANY PLACES HAVE YOU LIVED?: 1

## 2024-06-16 SDOH — SOCIAL STABILITY: SOCIAL INSECURITY: DO YOU FEEL UNSAFE GOING BACK TO THE PLACE WHERE YOU ARE LIVING?: UNABLE TO ASSESS

## 2024-06-16 SDOH — SOCIAL STABILITY: SOCIAL INSECURITY: ARE THERE ANY APPARENT SIGNS OF INJURIES/BEHAVIORS THAT COULD BE RELATED TO ABUSE/NEGLECT?: NO

## 2024-06-16 SDOH — SOCIAL STABILITY: SOCIAL INSECURITY: WERE YOU ABLE TO COMPLETE ALL THE BEHAVIORAL HEALTH SCREENINGS?: YES

## 2024-06-16 SDOH — SOCIAL STABILITY: SOCIAL INSECURITY: HAVE YOU HAD THOUGHTS OF HARMING ANYONE ELSE?: NO

## 2024-06-16 SDOH — SOCIAL STABILITY: SOCIAL INSECURITY: ABUSE: ADULT

## 2024-06-16 SDOH — ECONOMIC STABILITY: INCOME INSECURITY: HOW HARD IS IT FOR YOU TO PAY FOR THE VERY BASICS LIKE FOOD, HOUSING, MEDICAL CARE, AND HEATING?: NOT HARD AT ALL

## 2024-06-16 SDOH — HEALTH STABILITY: MENTAL HEALTH: HOW MANY STANDARD DRINKS CONTAINING ALCOHOL DO YOU HAVE ON A TYPICAL DAY?: PATIENT DOES NOT DRINK

## 2024-06-16 ASSESSMENT — ACTIVITIES OF DAILY LIVING (ADL)
ASSISTIVE_DEVICE: WALKER
LACK_OF_TRANSPORTATION: NO
GROOMING: NEEDS ASSISTANCE
HEARING - LEFT EAR: FUNCTIONAL
TOILETING: DEPENDENT
HEARING - RIGHT EAR: FUNCTIONAL
PATIENT'S MEMORY ADEQUATE TO SAFELY COMPLETE DAILY ACTIVITIES?: NO
JUDGMENT_ADEQUATE_SAFELY_COMPLETE_DAILY_ACTIVITIES: UNABLE TO ASSESS
ADEQUATE_TO_COMPLETE_ADL: YES
FEEDING YOURSELF: INDEPENDENT
DRESSING YOURSELF: NEEDS ASSISTANCE
BATHING: DEPENDENT
WALKS IN HOME: NEEDS ASSISTANCE

## 2024-06-16 ASSESSMENT — COGNITIVE AND FUNCTIONAL STATUS - GENERAL
HELP NEEDED FOR BATHING: A LOT
DRESSING REGULAR UPPER BODY CLOTHING: A LOT
MOVING TO AND FROM BED TO CHAIR: A LOT
PATIENT BASELINE BEDBOUND: NO
MOBILITY SCORE: 11
TURNING FROM BACK TO SIDE WHILE IN FLAT BAD: A LOT
TOILETING: TOTAL
CLIMB 3 TO 5 STEPS WITH RAILING: TOTAL
MOVING FROM LYING ON BACK TO SITTING ON SIDE OF FLAT BED WITH BEDRAILS: A LOT
WALKING IN HOSPITAL ROOM: A LOT
DAILY ACTIVITIY SCORE: 12
EATING MEALS: A LITTLE
STANDING UP FROM CHAIR USING ARMS: A LOT
DRESSING REGULAR LOWER BODY CLOTHING: TOTAL
PERSONAL GROOMING: A LITTLE

## 2024-06-16 ASSESSMENT — PAIN - FUNCTIONAL ASSESSMENT
PAIN_FUNCTIONAL_ASSESSMENT: FLACC (FACE, LEGS, ACTIVITY, CRY, CONSOLABILITY)
PAIN_FUNCTIONAL_ASSESSMENT: 0-10

## 2024-06-16 ASSESSMENT — LIFESTYLE VARIABLES
SKIP TO QUESTIONS 9-10: 1
HAVE PEOPLE ANNOYED YOU BY CRITICIZING YOUR DRINKING: NO
EVER FELT BAD OR GUILTY ABOUT YOUR DRINKING: NO
HOW MANY STANDARD DRINKS CONTAINING ALCOHOL DO YOU HAVE ON A TYPICAL DAY: PATIENT DOES NOT DRINK
TOTAL SCORE: 0
AUDIT-C TOTAL SCORE: 0
HOW OFTEN DO YOU HAVE 6 OR MORE DRINKS ON ONE OCCASION: NEVER
SKIP TO QUESTIONS 9-10: 1
HAVE YOU EVER FELT YOU SHOULD CUT DOWN ON YOUR DRINKING: NO
AUDIT-C TOTAL SCORE: 0
AUDIT-C TOTAL SCORE: 0
EVER HAD A DRINK FIRST THING IN THE MORNING TO STEADY YOUR NERVES TO GET RID OF A HANGOVER: NO
HOW OFTEN DO YOU HAVE A DRINK CONTAINING ALCOHOL: NEVER

## 2024-06-16 ASSESSMENT — PAIN SCALES - GENERAL: PAINLEVEL_OUTOF10: 0 - NO PAIN

## 2024-06-16 ASSESSMENT — PATIENT HEALTH QUESTIONNAIRE - PHQ9
2. FEELING DOWN, DEPRESSED OR HOPELESS: NOT AT ALL
SUM OF ALL RESPONSES TO PHQ9 QUESTIONS 1 & 2: 0
1. LITTLE INTEREST OR PLEASURE IN DOING THINGS: NOT AT ALL

## 2024-06-16 NOTE — ED PROVIDER NOTES
EMERGENCY DEPARTMENT ENCOUNTER      Pt Name: Vivienne Li  MRN: [id number]  Birthdate 1940  Date of evaluation: 6/16/2024  Provider: Aditya Stovall DO    CHIEF COMPLAINT       Chief Complaint   Patient presents with    Cough    Hypotension     Pt BIBA from home with c/o low blood pressure. Per medics the pt family c/o low blood pressure and the pt coughing up red tinged sputum yesterday with a non productive cough.       HISTORY OF PRESENT ILLNESS    Vivienne Li is a 83 y.o. female who presents to the emergency department with EMS for reported hematemesis as well as hypotension at home.  Patient is primarily [place] speaking.  Although when offered  she does not state anything to the .  She appears to be mentating appropriately.  Shortly thereafter her family did arrive they were able to provide pictures of the hematemesis which was rather significant.  She is not on anticoagulation.  Family was offered  although declined.  As they speak adequate English.  They deny any history of GI bleeds.  Denies any bloody or tarry stools either.  They do report that she has some underlying dementia.  She is mentating at her baseline patient's hemoglobin found to be 4.5.  Family did consent for blood products.  A total of 3 units initially ordered.          Nursing Notes were reviewed.    REVIEW OF SYSTEMS     CONSTITUTIONAL: Denies fever, sweats, chills.   NEURO: Denies difficulty walking, numbness, weakness, tingling, headache.   HEENT: Denies sore throat, rhinorrhea, changes in vision.   CARDIO: Denies chest pain, palpitations.  PULM: Denies shortness of breath, cough.   GI: Endorses nausea and vomiting.  Denies abdominal pain, diarrhea, constipation, melena, hematochezia.  : Denies painful urination, frequency, hematuria.   MSK: Denies recent trauma.   SKIN: Denies rash, lesions.   ENDOCRINE: Denies unexpected weight-loss.   HEME: Denies bleeding disorder.     PAST MEDICAL  HISTORY     Past Medical History:   Diagnosis Date    Essential (primary) hypertension 09/01/2022    Benign essential HTN    Other migraine, not intractable, without status migrainosus 09/01/2022    Migraine variant    Personal history of other mental and behavioral disorders 09/01/2022    History of anxiety       SURGICAL HISTORY       Past Surgical History:   Procedure Laterality Date    NO PAST SURGERIES         ALLERGIES     Patient has no known allergies.    FAMILY HISTORY       Family History   Problem Relation Name Age of Onset    No Known Problems Mother      No Known Problems Father          SOCIAL HISTORY       Social History     Socioeconomic History    Marital status:      Spouse name: None    Number of children: None    Years of education: None    Highest education level: None   Occupational History    None   Tobacco Use    Smoking status: Never    Smokeless tobacco: Never   Substance and Sexual Activity    Alcohol use: Never    Drug use: None    Sexual activity: None   Other Topics Concern    None   Social History Narrative    None     Social Determinants of Health     Financial Resource Strain: Low Risk  (6/16/2024)    Overall Financial Resource Strain (CARDIA)     Difficulty of Paying Living Expenses: Not hard at all   Food Insecurity: Not on file   Transportation Needs: No Transportation Needs (6/16/2024)    PRAPARE - Transportation     Lack of Transportation (Medical): No     Lack of Transportation (Non-Medical): No   Physical Activity: Not on file   Stress: Not on file   Social Connections: Not on file   Intimate Partner Violence: Not on file   Housing Stability: Low Risk  (6/16/2024)    Housing Stability Vital Sign     Unable to Pay for Housing in the Last Year: No     Number of Places Lived in the Last Year: 1     Unstable Housing in the Last Year: No       PHYSICAL EXAM   VS: As documented in the triage note from today's date and EMR flowsheet were reviewed.  Gen: Well developed. No  acute distress. Seated in bed. Appears nontoxic.  Pale appearing.  Alert and oriented to person.  Skin: Warm. Dry. Intact. No rashes or lesions.  Eyes: Pupils equally round and reactive to light. Clear sclera.   HENT: Atraumatic appearance. Mucosal membranes moist. No oral lesions, uvula midline, airway patent.  No oral bleeding.  CV: Regular rate and regular rhythm. S1, S2. No pedal edema. Warm extremities.  Resp: Nonlabored breathing Clear to auscultation bilaterally. No increased work of breathing.   GI: Soft and nontender. No rebound or guarding. Bowel sounds x4 present.  Figueroa sign McBurney's point tenderness is negative no CVA tenderness.  MSK: Symmetric muscle bulk. No joint swelling in the extremities. Compartments are soft. Neurovascularly intact x4 extremities. Radial pulses +2 equal bilaterally.  Pedal pulses +2 equal bilaterally.  Neuro: Speech fluent. Moving all extremities. No focal deficits. Gait normal.  Psych: Appropriate. Kempt.    DIAGNOSTIC RESULTS   RADIOLOGY:   Non-plain film images such as CT, Ultrasound and MRI are read by the radiologist. Plain radiographic images are visualized and preliminarily interpreted by the emergency physician with the below findings:      Interpretation per the Radiologist below, if available at the time of this note:    CT angio chest abdomen pelvis   Final Result   No aortic aneurysm or dissection.        Significant atherosclerotic calcifications with up to severe left   renal artery stenosis and asymmetric left renal atrophy noted.        Indeterminate 4 mm nodule in the left upper lobe. Follow-up suggested   as clinically indicated.        Small left pleural effusion present.        Findings are suggestive of acute gastritis. Clinical correlation   suggested.        Additional findings including cholelithiasis, adrenal nodules,   hepatic steatosis and diverticulosis.        MACRO:   None.        Signed by: Stella Justin 6/16/2024 5:50 PM   Dictation  workstation:   [id number]            ED BEDSIDE ULTRASOUND:   Performed by ED Physician - none    LABS:  Labs Reviewed   CBC WITH AUTO DIFFERENTIAL - Abnormal       Result Value    WBC 10.2      nRBC 0.0      RBC 1.67 (*)     Hemoglobin 4.5 (*)     Hematocrit 14.7 (*)     MCV 88      MCH 26.9      MCHC 30.6 (*)     RDW 14.9 (*)     Platelets 249      Neutrophils % 66.2      Immature Granulocytes %, Automated 0.6      Lymphocytes % 22.4      Monocytes % 8.2      Eosinophils % 2.4      Basophils % 0.2      Neutrophils Absolute 6.76 (*)     Immature Granulocytes Absolute, Automated 0.06      Lymphocytes Absolute 2.29      Monocytes Absolute 0.84 (*)     Eosinophils Absolute 0.25      Basophils Absolute 0.02     COMPREHENSIVE METABOLIC PANEL - Abnormal    Glucose 159 (*)     Sodium 139      Potassium 4.8      Chloride 112 (*)     Bicarbonate 20 (*)     Anion Gap 12      Urea Nitrogen 83 (*)     Creatinine 1.58 (*)     eGFR 32 (*)     Calcium 8.5 (*)     Albumin 3.1 (*)     Alkaline Phosphatase 47      Total Protein 5.5 (*)     AST 10      Bilirubin, Total 0.2      ALT 5 (*)    TROPONIN I, HIGH SENSITIVITY - Abnormal    Troponin I, High Sensitivity 15 (*)     Narrative:     Less than 99th percentile of normal range cutoff-  Female and children under 18 years old <14 ng/L; Male <21 ng/L: Negative  Repeat testing should be performed if clinically indicated.     Female and children under 18 years old 14-50 ng/L; Male 21-50 ng/L:  Consistent with possible cardiac damage and possible increased clinical   risk. Serial measurements may help to assess extent of myocardial damage.     >50 ng/L: Consistent with cardiac damage, increased clinical risk and  myocardial infarction. Serial measurements may help assess extent of   myocardial damage.      NOTE: Children less than 1 year old may have higher baseline troponin   levels and results should be interpreted in conjunction with the overall   clinical context.     NOTE: Troponin  I testing is performed using a different   testing methodology at [facility] than at other   [facility]. Direct result comparisons should only   be made within the same method.   COAGULATION SCREEN - Abnormal    Protime 13.0 (*)     INR 1.2 (*)     aPTT 30      Narrative:     The APTT is no longer used for monitoring Unfractionated Heparin Therapy. For monitoring Heparin Therapy, use the Heparin Assay.   TROPONIN I, HIGH SENSITIVITY - Abnormal    Troponin I, High Sensitivity 17 (*)     Narrative:     Less than 99th percentile of normal range cutoff-  Female and children under 18 years old <14 ng/L; Male <21 ng/L: Negative  Repeat testing should be performed if clinically indicated.     Female and children under 18 years old 14-50 ng/L; Male 21-50 ng/L:  Consistent with possible cardiac damage and possible increased clinical   risk. Serial measurements may help to assess extent of myocardial damage.     >50 ng/L: Consistent with cardiac damage, increased clinical risk and  myocardial infarction. Serial measurements may help assess extent of   myocardial damage.      NOTE: Children less than 1 year old may have higher baseline troponin   levels and results should be interpreted in conjunction with the overall   clinical context.     NOTE: Troponin I testing is performed using a different   testing methodology at [facility] than at other   [facility]. Direct result comparisons should only   be made within the same method.   CBC - Abnormal    WBC 8.5      nRBC 0.0      RBC 2.63 (*)     Hemoglobin 7.4 (*)     Hematocrit 23.4 (*)     MCV 89      MCH 28.1      MCHC 31.6 (*)     RDW 15.0 (*)     Platelets 222     BASIC METABOLIC PANEL - Abnormal    Glucose 101 (*)     Sodium 140      Potassium 4.7      Chloride 113 (*)     Bicarbonate 20 (*)     Anion Gap 12      Urea Nitrogen 68 (*)     Creatinine 1.56 (*)     eGFR 33 (*)     Calcium 8.5 (*)    TROPONIN I, HIGH SENSITIVITY - Abnormal     Troponin I, High Sensitivity 23 (*)     Narrative:     Less than 99th percentile of normal range cutoff-  Female and children under 18 years old <14 ng/L; Male <21 ng/L: Negative  Repeat testing should be performed if clinically indicated.     Female and children under 18 years old 14-50 ng/L; Male 21-50 ng/L:  Consistent with possible cardiac damage and possible increased clinical   risk. Serial measurements may help to assess extent of myocardial damage.     >50 ng/L: Consistent with cardiac damage, increased clinical risk and  myocardial infarction. Serial measurements may help assess extent of   myocardial damage.      NOTE: Children less than 1 year old may have higher baseline troponin   levels and results should be interpreted in conjunction with the overall   clinical context.     NOTE: Troponin I testing is performed using a different   testing methodology at [facility] than at other   [facility]. Direct result comparisons should only   be made within the same method.   HEMOGLOBIN AND HEMATOCRIT, BLOOD - Abnormal    Hemoglobin 6.8 (*)     Hematocrit 21.4 (*)    POCT GLUCOSE - Abnormal    POCT Glucose 107 (*)    POCT GLUCOSE - Abnormal    POCT Glucose 102 (*)    POCT GLUCOSE - Abnormal    POCT Glucose 108 (*)    LIPASE - Normal    Lipase 37      Narrative:     Venipuncture immediately after or during the administration of Metamizole may lead to falsely low results. Testing should be performed immediately prior to Metamizole dosing.   POCT GLUCOSE - Normal    POCT Glucose 97     POCT GLUCOSE - Normal    POCT Glucose 97     TYPE AND SCREEN    ABO TYPE O      Rh TYPE POS      ANTIBODY SCREEN NEG     VERAB/VERIFY ABORH    ABO TYPE O      Rh TYPE POS     HEMOGLOBIN AND HEMATOCRIT, BLOOD   PREPARE RBC    PRODUCT CODE X9924Y94      Unit Number [id number]      Unit ABO O      Unit RH POS      XM INTEP COMP      Dispense Status TR      Blood Expiration Date July 23, 2024 23:59 EDT       PRODUCT BLOOD TYPE 5100      UNIT VOLUME 350     PREPARE RBC    PRODUCT CODE I8686Q43      Unit Number [id number]      Unit ABO O      Unit RH POS      XM INTEP COMP      Dispense Status TR      Blood Expiration Date July 23, 2024 23:59 EDT      PRODUCT BLOOD TYPE 5100      UNIT VOLUME 266      PRODUCT CODE G4144C71      Unit Number [id number]      Unit ABO O      Unit RH POS      XM INTEP COMP      Dispense Status TR      Blood Expiration Date July 23, 2024 23:59 EDT      PRODUCT BLOOD TYPE 5100      UNIT VOLUME 267     MORPHOLOGY    RBC Morphology See Below      Polychromasia Mild      Ovalocytes Few      Teardrop Cells Few         All other labs were within normal range or not returned as of this dictation.    EMERGENCY DEPARTMENT COURSE/MDM:   Vitals:    Vitals:    06/17/24 0414 06/17/24 0640 06/17/24 0751 06/17/24 1149   BP: 119/55 108/51 124/60 123/58   BP Location:   Right arm Right arm   Patient Position:   Lying Lying   Pulse: 81 77 78 76   Resp: 24 20 18 20   Temp: 36.3 °C (97.3 °F) 36.5 °C (97.7 °F) 36 °C (96.8 °F) 36 °C (96.8 °F)   TempSrc: Temporal Temporal Temporal Temporal   SpO2: 96% 95% 95% 95%   Weight:       Height:           I reviewed the patient's triage vitals and they are initially hypotensive by EMS systolics in the 90s incremented appropriately to normotensive after fluids alone.  Patient remains hemodynamically stable.    Due to the above findings the following was ordered basic labs to include CT imaging of the chest abdomen pelvis.    Hemoglobin did result at 4.5 family has consented for blood products.  Total of 3 units were ordered.  She has no leukocytosis making infectious etiology less likely.  Renal function is at baseline although BUN elevated consistent with upper GI bleed.  Cardiac troponin minimally elevated no significant delta change suspect demand in nature as patient was hypotensive upon arrival.  CTA was emergently ordered due to hemoglobin level as well as  initial presentation of hypotension.  This did have multiple nonspecific findings all thoroughly discussed with family recommend close follow-up.  I did subsequently speak with GI on-call  who agreed with current PPI push of 80 mg also wanted PPI drip this was confirmed through pharmacy and ordered.  Patient remains hemodynamically stable I did have a thorough discussion with the hospitalist who is agreed to accept the patient they did take over care at time of admission order we have agreed on stepdown as patient's blood pressures have been stable and she has had no additional episodes of hematemesis.    Critical Care    Performed by: Aditya Stovall DO  Authorized by: Aditya Stovall DO    Critical care provider statement:     Critical care time (minutes):  42    Critical care time was exclusive of:  Separately billable procedures and treating other patients and teaching time    Critical care was necessary to treat or prevent imminent or life-threatening deterioration of the following conditions:  Shock (GIB)    Critical care was time spent personally by me on the following activities:  Review of old charts, re-evaluation of patient's condition, pulse oximetry, ordering and review of radiographic studies, ordering and review of laboratory studies, ordering and performing treatments and interventions, examination of patient, evaluation of patient's response to treatment and discussions with consultants    Care discussed with: admitting provider          ED Course as of 06/17/24 1239   Sun Jun 16, 2024   1320 Interpreted by the Emergency Department Attending: ECG revealed normal sinus rhythm at a rate of 71 beats per minute with FL interval 176 , QRS of 84 , QTc of 443.  No acute injury pattern.  No previous EKG to compare. [MG]      ED Course User Index  [MG] Aditya Stovall DO         Diagnoses as of 06/17/24 1239   Hematemesis, unspecified whether nausea present   Hypotension, unspecified  hypotension type       Patient was counseled regarding labs, imaging, likely diagnosis, and plan. All questions were answered.     ------------------------------------------------------------------  Information provided by EMS, family  Consults GI, hospitalist  Due to social and economic determinants English not primary language  Past medical history complicating workup underlying dementia  Previous medical records reviewed office visit 8/25/2023  Considered admission to ICU although pressure stable.  ------------------------------------------------------------------  ED Medications administered this visit:    Medications   pantoprazole (ProtoNix) 80 mg in sodium chloride 0.9% 100 mL (0.8 mg/mL) infusion (8 mg/hr intravenous New Bag 6/17/24 0416)   sertraline (Zoloft) tablet 50 mg (50 mg oral Not Given 6/17/24 0910)   acetaminophen (Tylenol) tablet 650 mg (has no administration in time range)     Or   acetaminophen (Tylenol) oral liquid 650 mg (has no administration in time range)     Or   acetaminophen (Tylenol) suppository 650 mg (has no administration in time range)   mirtazapine (Remeron) tablet 15 mg (15 mg oral Not Given 6/16/24 2306)   glucagon (Glucagen) injection 1 mg (has no administration in time range)   dextrose 50 % injection 25 g (has no administration in time range)   glucagon (Glucagen) injection 1 mg (has no administration in time range)   dextrose 50 % injection 12.5 g (has no administration in time range)   insulin lispro (HumaLOG) injection 0-5 Units ( subcutaneous Not Given 6/17/24 1200)   rosuvastatin (Crestor) tablet 40 mg (40 mg oral Not Given 6/17/24 0911)   melatonin tablet 5 mg (has no administration in time range)   lactated Ringer's bolus 1,000 mL (0 mL intravenous Stopped 6/16/24 1402)   ondansetron (Zofran) injection 4 mg (4 mg intravenous Given 6/16/24 1304)   pantoprazole (ProtoNix) injection 80 mg (80 mg intravenous Given 6/16/24 1401)   iohexol (OMNIPaque) 350 mg iodine/mL  solution 90 mL (90 mL intravenous Given 6/16/24 9080)           Final Impression:   1. Hematemesis, unspecified whether nausea present    2. Hypotension, unspecified hypotension type    3. Lung nodule          Aditya Stovall DO    (Please note that portions of this note were completed with a voice recognition program.  Efforts were made to edit the dictations but occasionally words are mis-transcribed.)     Aditya Stovall DO  06/17/24 8430

## 2024-06-16 NOTE — H&P
Hospital Medicine History & Physical    Subjective:  Vivienne Li is a 83 y.o. female with T2DM, HTN, HLD, osteoarthritis, dementia, who presents with hematemesis and hypotension. Daughter at bedside helps provide hx. Patient had 1 episode of hematemesis yesterday morning, daughter states it was a significant amount of blood with some clots as well. No further episodes since. No bloody or dark bowel movements. No abdominal pain for heartburn. Today, patient appears more pale and has been hypotensive since the morning, daughter states lowest BP was ~70/40 at home prompting them to come to ED. Patient has not taken her antihypertensives yesterday or today. Daughter and pt deny any additional complaints, no CP, SOB, confusion or focal neuro deficits. Patient denies any prior hx of hematemesis before this, no hx GIB, has never had a colonoscopy or EGD. No alcohol intake. Does take a BASA daily and has been taking naproxen 500 mg BID for about a month for her arthritis (previously was only taking once daily), no other NSAIDs.     Workup in ED with soft BP initially, hgb 4.5 (baseline ~12), Cr 1.58, trop 15 with repeat 17, CT angio negative for acute bleeding, shows acute gastritis, significant atherosclerotic calcifications with severe L renal artery stenosis & L renal atrophy, 4 mm L lung nodule, cholelithiasis & hepatic steatosis.     A 10 point ROS was completed and is negative expect as stated in HPI.     PMHx: As above  PSHx: Denies   Social Hx: Smoking- never smoker, Alcohol- denies, Recreational Drugs- denies  Family Hx: reviewed & noncontributory     Objective:    /59   Pulse 73   Temp 35.6 °C (96.1 °F) (Temporal)   Resp 17   SpO2 100%     Physical Exam:  General: Awake, alert, no distress, cooperative  HEENT: NC/AT, EOMI, clear sclera, oral mucosa dry  NECK: Supple  Cardiovascular: RRR, no murmurs. S1/S2  Respiratory: CTAB, no RRW or crackles. No distress  Abdomen: Soft, ND, non-tender.  BS+  Extremities: Minimal peripheral edema  Neurological: Awake, alert. CN II-XII grossly intact. No focal deficits  Skin: Warm and dry, no rashes  Psych: appropriate mood and affect    I personally reviewed all imaging, labs and notes/ documentation.     Assessment & Plan:    Hematemesis, GIB likely upper  Acute gastritis   Acute blood loss anemia 2/2 above  Elevated troponin, demand ischemia   Elevated Cr, ?CKD    Severe L renal artery stenosis 2/2 atherosclerotic calcifications with L renal atrophy  4 mm L lung nodule  Cholelithiasis & hepatic steatosis    T2DM  HTN, HLD  Osteoarthritis  Dementia    Suspect upper GIB in setting of NSAID use with acute gastritis on CT angio, pt takes BASA and naproxen 500mg BID x1 month. No hx GIB, has never had colonoscopy or EGD. Type & screen, monitor on stepdown, vitals q4, consented & will be transfused 3 units pRBCs total, trend h&h, protonix gtt, consult GI, CLD & NPO after midnight  Baseline hgb ~12, down to 4.5 on admission  Mild trop bump, no EKG changes, no angina, in setting of acute blood loss anemia  Cr 1.58, no documented hx of CKD but appears to be near baseline, avoid nephrotoxins, monitor  Incidental CT findings as above, OP follow up, lung nodule clinic referral  Hold home antihypertensives, BASA, naproxen, metformin & Trintellix, resume as able. ISS, monitor BG    DVT Prophylaxis: SCDs only  Code Status: Full Code confirmed with pt & daughter  Disposition: Stepdown      Tammy John,   Internal Medicine/ Hospitalist

## 2024-06-16 NOTE — PROGRESS NOTES
Pharmacy Medication History Review    Vivienne Li is a 83 y.o. female admitted for No Principal Problem: There is no principal problem currently on the Problem List. Please update the Problem List and refresh.. Pharmacy reviewed the patient's [id number] medications and allergies for accuracy.    The list below reflectives the updated PTA list. Please review each medication in order reconciliation for additional clarification and justification.  Prior to Admission medications    Medication Sig Start Date End Date Taking? Authorizing Provider   aspirin 81 mg EC tablet Take 1 tablet (81 mg) by mouth once daily.   Yes Historical Provider, MD   amLODIPine (Norvasc) 10 mg tablet Take 1 tablet (10 mg) by mouth once daily.  Patient taking differently: Take 1 tablet (10 mg) by mouth once daily. 2/9/24 2/3/25  Peng Velez MD   enalapril (Vasotec) 10 mg tablet Take 1 tablet (10 mg) by mouth once daily. 6/14/24 6/14/25  Peng Velez MD   hydroCHLOROthiazide (HYDRODiuril) 25 mg tablet Take 1 tablet (25 mg) by mouth once daily. 6/14/24 6/9/25  Peng Velez MD   HYDROcodone-acetaminophen (Norco) 5-325 mg tablet Take 1 tablet by mouth every 6 hours if needed for severe pain (7 - 10). 6/14/24 7/14/24  Peng Velez MD   meclizine (Antivert) 12.5 mg tablet Take 2 tablets (25 mg) by mouth 3 times a day as needed for dizziness. 6/14/24 6/9/25  Peng Velez MD   melatonin 3 mg tablet Take 1 tablet (3 mg) by mouth as needed at bedtime for sleep. 2/9/24 2/3/25  Peng Velez MD   metFORMIN (Glucophage) 1,000 mg tablet Take 1 tablet (1,000 mg) by mouth 2 times a day. 6/14/24 6/9/25  Peng Velez MD   methylPREDNISolone (Medrol Dospak) 4 mg tablets Take as directed on package.  Patient taking differently: Take as directed on package for 6 days. 6/14 - 6/18 6/14/24 6/21/24  Peng Velez MD   metoprolol succinate XL (Toprol-XL) 100 mg 24 hr tablet Take 1 tablet (100 mg) by mouth once daily. 6/14/24 6/9/25  Peng Velez MD    mirtazapine (Remeron) 15 mg tablet Take 1 tablet (15 mg) by mouth once daily at bedtime. 6/14/24 12/11/24  Peng Velez MD   naproxen (Naprosyn) 375 mg tablet Take 1 tablet (375 mg) by mouth 2 times a day as needed (for pain). 6/14/24 6/9/25  Peng Velez MD   rosuvastatin (Crestor) 40 mg tablet Take 1 tablet (40 mg) by mouth once daily. 6/14/24 6/9/25  Peng Velez MD   sertraline (Zoloft) 50 mg tablet Take 1 tablet (50 mg) by mouth once daily. 2/9/24 2/3/25  Peng Velez MD        The list below reflectives the updated allergy list. Please review each documented allergy for additional clarification and justification.  Allergies  Reviewed by Ivette Harrison RN on 6/16/2024   No Known Allergies         Below are additional concerns with the patient's PTA list.      Thelma West

## 2024-06-17 ENCOUNTER — ANESTHESIA (OUTPATIENT)
Dept: GASTROENTEROLOGY | Facility: HOSPITAL | Age: 84
End: 2024-06-17
Payer: COMMERCIAL

## 2024-06-17 ENCOUNTER — APPOINTMENT (OUTPATIENT)
Dept: GASTROENTEROLOGY | Facility: HOSPITAL | Age: 84
DRG: 378 | End: 2024-06-17
Payer: COMMERCIAL

## 2024-06-17 ENCOUNTER — ANESTHESIA EVENT (OUTPATIENT)
Dept: GASTROENTEROLOGY | Facility: HOSPITAL | Age: 84
End: 2024-06-17
Payer: COMMERCIAL

## 2024-06-17 PROBLEM — J38.5 LARYNGOSPASM: Status: ACTIVE | Noted: 2024-06-17

## 2024-06-17 LAB
ANION GAP SERPL CALC-SCNC: 12 MMOL/L (ref 10–20)
BLOOD EXPIRATION DATE: NORMAL
BLOOD EXPIRATION DATE: NORMAL
BUN SERPL-MCNC: 68 MG/DL (ref 6–23)
CALCIUM SERPL-MCNC: 8.5 MG/DL (ref 8.6–10.3)
CARDIAC TROPONIN I PNL SERPL HS: 23 NG/L (ref 0–13)
CHLORIDE SERPL-SCNC: 113 MMOL/L (ref 98–107)
CO2 SERPL-SCNC: 20 MMOL/L (ref 21–32)
CREAT SERPL-MCNC: 1.56 MG/DL (ref 0.5–1.05)
DISPENSE STATUS: NORMAL
DISPENSE STATUS: NORMAL
EGFRCR SERPLBLD CKD-EPI 2021: 33 ML/MIN/1.73M*2
ERYTHROCYTE [DISTWIDTH] IN BLOOD BY AUTOMATED COUNT: 15 % (ref 11.5–14.5)
GLUCOSE BLD MANUAL STRIP-MCNC: 108 MG/DL (ref 74–99)
GLUCOSE BLD MANUAL STRIP-MCNC: 114 MG/DL (ref 74–99)
GLUCOSE BLD MANUAL STRIP-MCNC: 97 MG/DL (ref 74–99)
GLUCOSE BLD MANUAL STRIP-MCNC: 97 MG/DL (ref 74–99)
GLUCOSE SERPL-MCNC: 101 MG/DL (ref 74–99)
HCT VFR BLD AUTO: 21.4 % (ref 36–46)
HCT VFR BLD AUTO: 23.4 % (ref 36–46)
HCT VFR BLD AUTO: 25 % (ref 36–46)
HGB BLD-MCNC: 6.8 G/DL (ref 12–16)
HGB BLD-MCNC: 7.4 G/DL (ref 12–16)
HGB BLD-MCNC: 7.8 G/DL (ref 12–16)
HOLD SPECIMEN: NORMAL
MCH RBC QN AUTO: 28.1 PG (ref 26–34)
MCHC RBC AUTO-ENTMCNC: 31.6 G/DL (ref 32–36)
MCV RBC AUTO: 89 FL (ref 80–100)
NRBC BLD-RTO: 0 /100 WBCS (ref 0–0)
PLATELET # BLD AUTO: 222 X10*3/UL (ref 150–450)
POTASSIUM SERPL-SCNC: 4.7 MMOL/L (ref 3.5–5.3)
PRODUCT BLOOD TYPE: 5100
PRODUCT BLOOD TYPE: 5100
PRODUCT CODE: NORMAL
PRODUCT CODE: NORMAL
RBC # BLD AUTO: 2.63 X10*6/UL (ref 4–5.2)
SODIUM SERPL-SCNC: 140 MMOL/L (ref 136–145)
UNIT ABO: NORMAL
UNIT ABO: NORMAL
UNIT NUMBER: NORMAL
UNIT NUMBER: NORMAL
UNIT RH: NORMAL
UNIT RH: NORMAL
UNIT VOLUME: 266
UNIT VOLUME: 267
WBC # BLD AUTO: 8.5 X10*3/UL (ref 4.4–11.3)
XM INTEP: NORMAL
XM INTEP: NORMAL

## 2024-06-17 PROCEDURE — 85014 HEMATOCRIT: CPT | Mod: 91 | Performed by: STUDENT IN AN ORGANIZED HEALTH CARE EDUCATION/TRAINING PROGRAM

## 2024-06-17 PROCEDURE — 99223 1ST HOSP IP/OBS HIGH 75: CPT | Performed by: INTERNAL MEDICINE

## 2024-06-17 PROCEDURE — 84484 ASSAY OF TROPONIN QUANT: CPT | Performed by: INTERNAL MEDICINE

## 2024-06-17 PROCEDURE — 99233 SBSQ HOSP IP/OBS HIGH 50: CPT | Performed by: STUDENT IN AN ORGANIZED HEALTH CARE EDUCATION/TRAINING PROGRAM

## 2024-06-17 PROCEDURE — 43239 EGD BIOPSY SINGLE/MULTIPLE: CPT | Performed by: INTERNAL MEDICINE

## 2024-06-17 PROCEDURE — C9113 INJ PANTOPRAZOLE SODIUM, VIA: HCPCS | Performed by: INTERNAL MEDICINE

## 2024-06-17 PROCEDURE — 2500000004 HC RX 250 GENERAL PHARMACY W/ HCPCS (ALT 636 FOR OP/ED)

## 2024-06-17 PROCEDURE — 0DB68ZX EXCISION OF STOMACH, VIA NATURAL OR ARTIFICIAL OPENING ENDOSCOPIC, DIAGNOSTIC: ICD-10-PCS | Performed by: INTERNAL MEDICINE

## 2024-06-17 PROCEDURE — 36430 TRANSFUSION BLD/BLD COMPNT: CPT

## 2024-06-17 PROCEDURE — 0753T DGTZ GLS MCRSCP SLD LEVEL IV: CPT | Mod: TC,PARLAB | Performed by: INTERNAL MEDICINE

## 2024-06-17 PROCEDURE — 85014 HEMATOCRIT: CPT | Performed by: INTERNAL MEDICINE

## 2024-06-17 PROCEDURE — 2060000001 HC INTERMEDIATE ICU ROOM DAILY

## 2024-06-17 PROCEDURE — 2500000004 HC RX 250 GENERAL PHARMACY W/ HCPCS (ALT 636 FOR OP/ED): Performed by: STUDENT IN AN ORGANIZED HEALTH CARE EDUCATION/TRAINING PROGRAM

## 2024-06-17 PROCEDURE — 85027 COMPLETE CBC AUTOMATED: CPT | Performed by: INTERNAL MEDICINE

## 2024-06-17 PROCEDURE — 7100000001 HC RECOVERY ROOM TIME - INITIAL BASE CHARGE

## 2024-06-17 PROCEDURE — 2500000002 HC RX 250 W HCPCS SELF ADMINISTERED DRUGS (ALT 637 FOR MEDICARE OP, ALT 636 FOR OP/ED): Performed by: INTERNAL MEDICINE

## 2024-06-17 PROCEDURE — 82947 ASSAY GLUCOSE BLOOD QUANT: CPT | Mod: 91,MUE

## 2024-06-17 PROCEDURE — 82947 ASSAY GLUCOSE BLOOD QUANT: CPT

## 2024-06-17 PROCEDURE — P9016 RBC LEUKOCYTES REDUCED: HCPCS

## 2024-06-17 PROCEDURE — 82947 ASSAY GLUCOSE BLOOD QUANT: CPT | Mod: 91

## 2024-06-17 PROCEDURE — 36415 COLL VENOUS BLD VENIPUNCTURE: CPT | Performed by: INTERNAL MEDICINE

## 2024-06-17 PROCEDURE — 7100000002 HC RECOVERY ROOM TIME - EACH INCREMENTAL 1 MINUTE

## 2024-06-17 PROCEDURE — 0DB78ZX EXCISION OF STOMACH, PYLORUS, VIA NATURAL OR ARTIFICIAL OPENING ENDOSCOPIC, DIAGNOSTIC: ICD-10-PCS | Performed by: INTERNAL MEDICINE

## 2024-06-17 PROCEDURE — 80048 BASIC METABOLIC PNL TOTAL CA: CPT | Performed by: INTERNAL MEDICINE

## 2024-06-17 PROCEDURE — 3700000002 HC GENERAL ANESTHESIA TIME - EACH INCREMENTAL 1 MINUTE

## 2024-06-17 PROCEDURE — 2500000005 HC RX 250 GENERAL PHARMACY W/O HCPCS

## 2024-06-17 PROCEDURE — 2500000004 HC RX 250 GENERAL PHARMACY W/ HCPCS (ALT 636 FOR OP/ED): Performed by: INTERNAL MEDICINE

## 2024-06-17 PROCEDURE — 3700000001 HC GENERAL ANESTHESIA TIME - INITIAL BASE CHARGE

## 2024-06-17 RX ORDER — SODIUM CHLORIDE, SODIUM LACTATE, POTASSIUM CHLORIDE, CALCIUM CHLORIDE 600; 310; 30; 20 MG/100ML; MG/100ML; MG/100ML; MG/100ML
50 INJECTION, SOLUTION INTRAVENOUS CONTINUOUS
Status: DISCONTINUED | OUTPATIENT
Start: 2024-06-17 | End: 2024-06-18

## 2024-06-17 RX ORDER — SODIUM CHLORIDE, SODIUM LACTATE, POTASSIUM CHLORIDE, CALCIUM CHLORIDE 600; 310; 30; 20 MG/100ML; MG/100ML; MG/100ML; MG/100ML
INJECTION, SOLUTION INTRAVENOUS CONTINUOUS PRN
Status: DISCONTINUED | OUTPATIENT
Start: 2024-06-17 | End: 2024-06-17

## 2024-06-17 RX ORDER — LIDOCAINE HCL/PF 100 MG/5ML
SYRINGE (ML) INTRAVENOUS AS NEEDED
Status: DISCONTINUED | OUTPATIENT
Start: 2024-06-17 | End: 2024-06-17

## 2024-06-17 RX ORDER — PROPOFOL 10 MG/ML
INJECTION, EMULSION INTRAVENOUS AS NEEDED
Status: DISCONTINUED | OUTPATIENT
Start: 2024-06-17 | End: 2024-06-17

## 2024-06-17 RX ADMIN — PANTOPRAZOLE SODIUM 8 MG/HR: 40 INJECTION, POWDER, FOR SOLUTION INTRAVENOUS at 18:09

## 2024-06-17 RX ADMIN — PANTOPRAZOLE SODIUM 8 MG/HR: 40 INJECTION, POWDER, FOR SOLUTION INTRAVENOUS at 04:16

## 2024-06-17 RX ADMIN — SODIUM CHLORIDE, POTASSIUM CHLORIDE, SODIUM LACTATE AND CALCIUM CHLORIDE 50 ML/HR: 600; 310; 30; 20 INJECTION, SOLUTION INTRAVENOUS at 20:30

## 2024-06-17 SDOH — HEALTH STABILITY: MENTAL HEALTH: CURRENT SMOKER: 0

## 2024-06-17 ASSESSMENT — COGNITIVE AND FUNCTIONAL STATUS - GENERAL
HELP NEEDED FOR BATHING: A LOT
EATING MEALS: A LITTLE
STANDING UP FROM CHAIR USING ARMS: A LOT
CLIMB 3 TO 5 STEPS WITH RAILING: TOTAL
TURNING FROM BACK TO SIDE WHILE IN FLAT BAD: A LOT
MOBILITY SCORE: 11
DAILY ACTIVITIY SCORE: 12
MOVING TO AND FROM BED TO CHAIR: A LOT
DRESSING REGULAR UPPER BODY CLOTHING: A LOT
DAILY ACTIVITIY SCORE: 12
TURNING FROM BACK TO SIDE WHILE IN FLAT BAD: A LOT
PERSONAL GROOMING: A LITTLE
TOILETING: TOTAL
MOBILITY SCORE: 11
TOILETING: TOTAL
MOVING FROM LYING ON BACK TO SITTING ON SIDE OF FLAT BED WITH BEDRAILS: A LOT
DRESSING REGULAR UPPER BODY CLOTHING: A LOT
STANDING UP FROM CHAIR USING ARMS: A LOT
EATING MEALS: A LITTLE
MOVING TO AND FROM BED TO CHAIR: A LOT
PERSONAL GROOMING: A LITTLE
DRESSING REGULAR LOWER BODY CLOTHING: TOTAL
HELP NEEDED FOR BATHING: A LOT
WALKING IN HOSPITAL ROOM: A LOT
CLIMB 3 TO 5 STEPS WITH RAILING: TOTAL
WALKING IN HOSPITAL ROOM: A LOT
MOVING FROM LYING ON BACK TO SITTING ON SIDE OF FLAT BED WITH BEDRAILS: A LOT
DRESSING REGULAR LOWER BODY CLOTHING: TOTAL

## 2024-06-17 ASSESSMENT — PAIN SCALES - GENERAL
PAINLEVEL_OUTOF10: 0 - NO PAIN
PAINLEVEL_OUTOF10: 0 - NO PAIN
PAIN_LEVEL: 0
PAINLEVEL_OUTOF10: 0 - NO PAIN

## 2024-06-17 ASSESSMENT — PAIN - FUNCTIONAL ASSESSMENT
PAIN_FUNCTIONAL_ASSESSMENT: 0-10

## 2024-06-17 NOTE — CARE PLAN
The patient's goals for the shift include      The clinical goals for the shift include complete EGD this day/ maintain hemodynamical state

## 2024-06-17 NOTE — PROGRESS NOTES
06/17/24 1242   Discharge Planning   Living Arrangements Alone   Support Systems Children   Assistance Needed Assist with ADL's, daughter provides transportation   Type of Residence Private residence  (2nd floor apartment)   Home or Post Acute Services In home services   Type of Home Care Services Home health aide   Patient expects to be discharged to: Home     Met with patient and patients daughter at bedside, introduced self and role on care transitions team. Admission assessment completed with patients daughter. Address, insurance and contact information verified. Patient lives at home, patients daughter lives nearby. Patient daughter states patient has home health aide daily from 9-12 and then daughter visits in afternoon. Patient uses walker and wheelchair, has bedside commode and shower chair. PCP is Dr. Velez, has virtual visit four days ago. Confirmed with patients daughter plan for patient to return home at discharge and resume home health aide services. Patients daughter has declined any further home going needs at this time. Patients daughter will transport patient home at discharge.

## 2024-06-17 NOTE — NURSING NOTE
Daughter refusing medications until after EGD. Small sips of water given and patient remains NPO at this time.

## 2024-06-17 NOTE — ANESTHESIA POSTPROCEDURE EVALUATION
Patient: Vivienne Li    Procedure Summary       Date: 06/17/24 Room / Location: [facility]    Anesthesia Start: 1617 Anesthesia Stop: 1705    Procedure: EGD Diagnosis: Hematemesis, unspecified whether nausea present    Scheduled Providers: Wilmar Che MD; Tomi Hilario MD Responsible Provider: Perry Velasquez MD    Anesthesia Type: MAC ASA Status: 4            Anesthesia Type: MAC    Vitals Value Taken Time   /63 06/17/24 1700   Temp 37.6 06/17/24 1705   Pulse 84 06/17/24 1704   Resp 16 06/17/24 1705   SpO2 98 % 06/17/24 1704   Vitals shown include unfiled device data.    Anesthesia Post Evaluation    Patient location during evaluation: PACU  Patient participation: complete - patient participated  Level of consciousness: awake and alert  Pain score: 0  Pain management: adequate  Airway patency: patent  Cardiovascular status: acceptable and hemodynamically stable  Respiratory status: acceptable and nasal cannula  Hydration status: acceptable  Postoperative Nausea and Vomiting: none      There were no known notable events for this encounter.

## 2024-06-17 NOTE — NURSING NOTE
Requesting new bag of Protonix drip at 1322. Transport picking patient up for EGD at 1400. Protonix near empty with no new bag on floor. Pausing drip and to resume when returning from procedure.

## 2024-06-17 NOTE — ANESTHESIA PREPROCEDURE EVALUATION
Patient: Vivienne Li    Procedure Information       Date/Time: 06/17/24 1610    Scheduled providers: Wilmar Che MD; Tomi Hilario MD    Procedure: EGD    Location: [facility]            Relevant Problems   Cardiac   (+) HTN (hypertension)   (+) Mixed hyperlipidemia      GI   (+) Hematemesis, unspecified whether nausea present      Endocrine   (+) Obesity, unspecified   (+) Type 2 diabetes mellitus (Multi)       Clinical information reviewed:                   NPO Detail:  No data recorded     Physical Exam    Airway  Mallampati: III  TM distance: >3 FB  Neck ROM: full     Cardiovascular   Rhythm: regular  Rate: normal  (+) murmur     Dental - normal exam     Pulmonary - normal exam     Abdominal - normal exam             Anesthesia Plan    History of general anesthesia?: yes  History of complications of general anesthesia?: no    ASA 4     MAC     The patient is not a current smoker.  Patient was previously instructed to abstain from smoking on day of procedure.  Patient did not smoke on day of procedure.    intravenous induction   Anesthetic plan and risks discussed with patient.  Use of blood products discussed with patient who consented to blood products.    Plan discussed with CRNA.

## 2024-06-17 NOTE — CONSULTS
Reason For Consult  GIB    History Of Present Illness  Vivienne Li is a 83 y.o. female  with T2DM, HTN, HLD, osteoarthritis, dementia, who presented 6/4/2016/24 with hematemesis and hypotension.     Patient's daughter she had 1 episode of bright red blood with clots vomiting Saturday morning.  No additional vomiting as day progressed but Sunday a.m. patient had blood pressure around 70/40 which prompted daughter to bring to pt to ER.  Patient is poor historian and would not provide any details.  Denies any discomfort, abdominal pain, nausea, dysphagia or diarrhea at the moment of exam.  Per patient's daughter no history of significant heartburn or abdominal pain, no blood in stool with last bowel movement yesterday, formed, brown.  History of naproxen daily which last month was bumped up to 375 milligram twice daily per daughter in addition to baby aspirin.  In addition patient takes fairly frequent bursts of prednisone for  arthritis per daughter's report.  Patient and family deny any previous history of endoscopic studies.    No overt GI bleed observed while admitted.    Blood work today shows H&H 7.4 in a.m., later in the day 7.8, on admission 4.5, baseline between 11.1-13.7.  Troponin on admission 15-17-23  Creatinine 1.56, BUN 68.    CTA of abdomen/pelvis showed findings suggestive of acute gastritis, in addition to cholelithiasis, hepatic steatosis and diverticulosis, please see full official report for additional findings    Past Medical History  She has a past medical history of Essential (primary) hypertension (09/01/2022), Other migraine, not intractable, without status migrainosus (09/01/2022), and Personal history of other mental and behavioral disorders (09/01/2022).    Surgical History  She has a past surgical history that includes No past surgeries.     Social History  She reports that she has never smoked. She has never used smokeless tobacco. She reports that she does not drink alcohol. No  "history on file for drug use.    Family History  Family History   Problem Relation Name Age of Onset    No Known Problems Mother      No Known Problems Father          Allergies  Patient has no known allergies.    Review of Systems    A 10 point review of system is negative except for what is mentioned in the HPI     Physical Exam    The note was created using voice recognition transcription software. Despite proofreading, unintentional typographical errors may be present. Please contact the GI office with any questions or concerns.     Current Medications: reviewed  Vital Signs: Reviewed    Physical Exam:  General: no apparent distress, sleepy  Skin:  Warm and dry, no jaundice  HEENT: No scleral icterus, no conjunctival pallor, normocephalic, atraumatic, mucous membranes moist  Neck:  atraumatic, trachea midline, no JVD  Chest:  decreased air entry to auscultation bilaterally. No wheezes, rales, or rhonchi  CV:  Regular rate and rhythm.  Positive S1/S2  Abdomen: no distension, +BS, soft, non-tender to palpation, no rebound tenderness, no guarding, no rigidity, no discernible ascites   Extremities: no lower extremity edema, Chronic pigmentary changes, no cyanosis  Neurological:  A&Ox3 , no asterixis  Psychiatric: cooperative     Investigations:  Labs, radiological imaging and cardiac work up were reviewed     Last Recorded Vitals  Blood pressure 124/60, pulse 78, temperature 36 °C (96.8 °F), temperature source Temporal, resp. rate 18, height 1.575 m (5' 2\"), weight 93 kg (205 lb 0.4 oz), SpO2 95%.    Relevant Results      Scheduled medications  insulin lispro, 0-5 Units, subcutaneous, q4h  mirtazapine, 15 mg, oral, Nightly  rosuvastatin, 40 mg, oral, Daily  sertraline, 50 mg, oral, Daily      Continuous medications  pantoprazole (ProtoNix) infusion, 8 mg/hr, Last Rate: 8 mg/hr (06/17/24 0402)      PRN medications  PRN medications: acetaminophen **OR** acetaminophen **OR** acetaminophen, dextrose, dextrose, glucagon, " glucagon, melatonin    Results for orders placed or performed during the hospital encounter of 06/16/24 (from the past 24 hour(s))   Prepare RBC: 2 Units   Result Value Ref Range    PRODUCT CODE X8860O95     Unit Number [id number]     Unit ABO O     Unit RH POS     XM INTEP COMP     Dispense Status TR     Blood Expiration Date July 23, 2024 23:59 EDT     PRODUCT BLOOD TYPE 5100     UNIT VOLUME 266     PRODUCT CODE N4451U61     Unit Number [id number]     Unit ABO O     Unit RH POS     XM INTEP COMP     Dispense Status TR     Blood Expiration Date July 23, 2024 23:59 EDT     PRODUCT BLOOD TYPE 5100     UNIT VOLUME 267    POCT GLUCOSE   Result Value Ref Range    POCT Glucose 107 (H) 74 - 99 mg/dL   POCT GLUCOSE   Result Value Ref Range    POCT Glucose 102 (H) 74 - 99 mg/dL   Hemoglobin and hematocrit, blood   Result Value Ref Range    Hemoglobin 6.8 (L) 12.0 - 16.0 g/dL    Hematocrit 21.4 (L) 36.0 - 46.0 %   POCT GLUCOSE   Result Value Ref Range    POCT Glucose 108 (H) 74 - 99 mg/dL   CBC   Result Value Ref Range    WBC 8.5 4.4 - 11.3 x10*3/uL    nRBC 0.0 0.0 - 0.0 /100 WBCs    RBC 2.63 (L) 4.00 - 5.20 x10*6/uL    Hemoglobin 7.4 (L) 12.0 - 16.0 g/dL    Hematocrit 23.4 (L) 36.0 - 46.0 %    MCV 89 80 - 100 fL    MCH 28.1 26.0 - 34.0 pg    MCHC 31.6 (L) 32.0 - 36.0 g/dL    RDW 15.0 (H) 11.5 - 14.5 %    Platelets 222 150 - 450 x10*3/uL   Basic metabolic panel   Result Value Ref Range    Glucose 101 (H) 74 - 99 mg/dL    Sodium 140 136 - 145 mmol/L    Potassium 4.7 3.5 - 5.3 mmol/L    Chloride 113 (H) 98 - 107 mmol/L    Bicarbonate 20 (L) 21 - 32 mmol/L    Anion Gap 12 10 - 20 mmol/L    Urea Nitrogen 68 (H) 6 - 23 mg/dL    Creatinine 1.56 (H) 0.50 - 1.05 mg/dL    eGFR 33 (L) >60 mL/min/1.73m*2    Calcium 8.5 (L) 8.6 - 10.3 mg/dL   Troponin I, High Sensitivity   Result Value Ref Range    Troponin I, High Sensitivity 23 (H) 0 - 13 ng/L   POCT GLUCOSE   Result Value Ref Range    POCT Glucose 97 74 - 99 mg/dL   POCT  GLUCOSE   Result Value Ref Range    POCT Glucose 97 74 - 99 mg/dL   Hemoglobin and hematocrit, blood   Result Value Ref Range    Hemoglobin 7.8 (L) 12.0 - 16.0 g/dL    Hematocrit 25.0 (L) 36.0 - 46.0 %          Assessment/Plan     Vivienne Li is a 83 y.o. female  with T2DM, HTN, HLD, osteoarthritis, dementia, who presented  with hematemesis and hypotension.     Per patient's daughter 1 episode of large-volume hematemesis, bright red with clots before admission, followed by hypotension.  Denies any hematochezia or melena.  No significant heartburn per family.  History of naproxen twice daily last months and frequent courses of prednisone.    Most likely upper GI bleed caused by PUD    Continue NPO  Protonix drip as ordered  EGD today  Upcoming procedure discussed on the phone with patient's daughter Chantal, all questions answered, daughter agrees to procedure.  Continue serial CBC, active type and screen on file, 2 large-bore IV access, replenish as needed, patient status post 3 units of RBCs    Patient discussed with Dr. Che    I spent 30 minutes in the professional and overall care of this patient.      Jes García, RICK-CNP  Patient seen and examined by me personally with the help of the daughter who speaks perfect [place].  According to the daughter this patient has developed dementia in the past 2 years ever since her  .  She has significant arthritis of her knees and was started on naproxen twice a day.  The exact time it was started is not quite clear but perhaps may be up to 3 to 4 weeks.  The mother never complained of any abdominal pain or any vomiting.  She was brought in because a history of hematemesis..  Physical examination showed LAD with some communication was difficult partly because of language issues and more importantly secondary to dementia.  She had no significant numbers in the neck heart showed no tachycardia lungs are clear abdomen exam was unremarkable  I send no tenderness or masses noted  Her hemoglobin was rather very low when she came in she has been transfused 4 units and now it is steady  The CT shows cholelithiasis otherwise no serious abnormality noted and some mild thickening of the gastric mucosa  I think the patient would require to have an EGD done.  The daughter agreed for the same and signed the consent form

## 2024-06-17 NOTE — PROGRESS NOTES
Vivienne Li is a 83 y.o. female on day 1 of admission presenting with Hematemesis, unspecified whether nausea present.      Subjective   Seen at bedside.  Upper sorbian speaking.  Daughter at bedside to help with translation.  Patient said she is feeling fine, denied any pain.  Last bowel movement yesterday.  On nasal cannula 2 L SO2 95% HR 74 on monitor.  At home her mobility is limited.  Uses a walker but does not walk much.       Objective     Last Recorded Vitals  /60 (BP Location: Right arm, Patient Position: Lying)   Pulse 78   Temp 36 °C (96.8 °F) (Temporal)   Resp 18   Wt 93 kg (205 lb 0.4 oz)   SpO2 95%   Intake/Output last 3 Shifts:    Intake/Output Summary (Last 24 hours) at 6/17/2024 1039  Last data filed at 6/17/2024 0640  Gross per 24 hour   Intake 925 ml   Output --   Net 925 ml       Admission Weight  Weight: 93 kg (205 lb 0.4 oz) (06/17/24 0037)    Daily Weight  06/17/24 : 93 kg (205 lb 0.4 oz)    Image Results  CT angio chest abdomen pelvis  Narrative: Interpreted By:  Stella Justin,   STUDY:  CT ANGIO CHEST ABDOMEN PELVIS;  6/16/2024 4:51 pm      INDICATION:  Signs/Symptoms:Pink-tinged hematemesis hemoglobin 4 poor medical  historian English not primary language.      COMPARISON:  None.      ACCESSION NUMBER(S):  [id number]      ORDERING CLINICIAN:  JESSICA WARD      TECHNIQUE:  Axial CT images of the chest, abdomen and pelvis  before and after  intravenous administration of contrast using CT angiographic  technique. Coronal and sagittal images are reconstructed.  3D  reconstructions were obtained at a separate workstation.      FINDINGS:  VASCULAR:  AORTA: Initial noncontrast images demonstrate no aortic intramural  hematoma. No aortic aneurysm or dissection. Significant  atherosclerotic disease. Atherosclerotic calcifications with at least  moderate stenosis of the SMA and proximal left renal artery.      CHEST:      HEART: Cardiomegaly. Dense calcifications of the mitral  annulus and  aortic valve. No significant pericardial effusion. Coronary artery  calcifications noted however exam is not optimized for evaluation.  Common origin of the brachiocephalic and left common carotid artery,  a normal anatomic branch pattern noted. MEDIASTINUM AND HUBER: No  pathologically enlarged thoracic lymph nodes. LUNG, PLEURA, LARGE  AIRWAYS: Central airways are grossly patent. Small left pleural  effusion with mild adjacent atelectasis. Evaluation of the parenchyma  partially limited by respiratory motion. 3-4 mm nodule noted in the  lower aspect of the left upper lobe. Mild parenchymal haziness noted,  nonspecific. No focal consolidation or sizeable pneumothorax  identified. CHEST WALL AND LOWER NECK: Partially calcified nodule in  the right lateral chest. Subcentimeter low-attenuation nodule in the  right lobe of the thyroid.      ABDOMEN:      BONES: Diffuse osseous demineralization with multilevel degenerative  changes including minimal anterolisthesis of L4 on L5. Multilevel  osteophytes in the thoracic spine suggestive of diffuse idiopathic  skeletal hyperostosis. ABDOMINAL WALL: Fat containing left inguinal  hernia. Portions of the abdominal wall excluded due to patient body  habitus.      LIVER: Diffusely decreased attenuation.  BILE DUCTS: Normal caliber.  GALLBLADDER: Calcified gallstone. No wall thickening.  PANCREAS: Within normal limits.  SPLEEN: Within normal limits.  ADRENALS:  14 mm right adrenal nodule and mild nodularity of the left  adrenal gland. KIDNEYS: Significant left renal atrophy. No  hydronephrosis or perinephric fluid collection. URETERS: No  hydroureter.      PELVIS:      REPRODUCTIVE ORGANS: Probable fibroids  BLADDER: Within normal limits.      RETROPERITONEUM: Within normal limits.  BOWEL: Mild wall thickening and submucosal edema within stomach.  Normal caliber. Mild-to-moderate stool burden. Diverticulosis without  CT evidence of acute diverticulitis. Normal  appendix. PERITONEUM: No  ascites or free air, no fluid collection.      Impression: No aortic aneurysm or dissection.      Significant atherosclerotic calcifications with up to severe left  renal artery stenosis and asymmetric left renal atrophy noted.      Indeterminate 4 mm nodule in the left upper lobe. Follow-up suggested  as clinically indicated.      Small left pleural effusion present.      Findings are suggestive of acute gastritis. Clinical correlation  suggested.      Additional findings including cholelithiasis, adrenal nodules,  hepatic steatosis and diverticulosis.      MACRO:  None.      Signed by: Stella Justin 6/16/2024 5:50 PM  Dictation workstation:   [id number]        Physical Exam  General: alert and oriented. No acute distress.  HEENT: oral mucosa moist, EOMI.  CHEST: clear breath sounds, no crackles, no wheeze, no tachypnea.  CVS: regular rate and rhythm, systolic murmur 2/6.   ABD: Soft, Non Tender, BS present.  EXT: no edema.  SKIN: no rash.  NEURO: Nonfocal grossly.      Assessment/Plan   This patient currently has cardiac telemetry ordered; if you would like to modify or discontinue the telemetry order, click here to go to the orders activity to modify/discontinue the order.    GI bleed, likely upper  -Admitted due to hematemesis. History of gastritis.  -Using NSAIDs due to OA, naproxen.  -CT AP chest abdomen: Acute gastritis, diverticulosis, hepatic steatosis.  --GI evaluation pending, EGD in the afternoon  -- Continue pantoprazole drip    Acute on chronic anemia 2/2 blood loss  -Baseline hemoglobin 11-13, now down to 6.8  -Received 3 RBCp  -Monitor H&H    Elevated troponin, demand ischemia  -Denied chest pain.  EKG no signs of ischemia. Troponin: 15, 17, 23    CKD 3b  -Creatinine 1.5  -Trend BMP    Type 2 diabetes  -Home meds metformin 1 g twice daily  -Continue with ISS# 1    Hypertension  -Holding home amlodipine 10 mg daily, HCTZ 25 mg daily, metoprolol 100 mg  daily    Dyslipidemia  -Continue with home med Crestor 40 mg daily    Osteoarthritis, BL knees  -Holding home naproxen    Dementia  Mood disorder  -Continue home sertraline 50 mg daily  -Continue home mirtazapine 15 mg daily    Left lung nodule  -4 mm, follow-up as an outpatient    Adrenal nodules  -14 mm right adrenal nodule and mild nodularity of the left gland  -Needs follow-up as an outpatient        Diet: N.p.o. for procedure  Lines/Devices: peripheral   Drips: Pantoprazole  ATBx: -  PPX: Pantoprazole and SCDs  Code: Full code  Dispo: Telemetry bed  Discharge planning: Patient lives with her daughter.  Daughter wants her to go back home with home health when appropriate.      *This document was created using dragon dictation and may contain unintended error.           Adia Partida MD

## 2024-06-17 NOTE — CARE PLAN
Problem: Pain - Adult  Goal: Verbalizes/displays adequate comfort level or baseline comfort level  Outcome: Progressing     Problem: Safety - Adult  Goal: Free from fall injury  Outcome: Progressing     Problem: Chronic Conditions and Co-morbidities  Goal: Patient's chronic conditions and co-morbidity symptoms are monitored and maintained or improved  Outcome: Progressing     Problem: Fall/Injury  Goal: Not fall by end of shift  Outcome: Progressing  Goal: Be free from injury by end of the shift  Outcome: Progressing     Problem: Skin  Goal: Prevent/manage excess moisture  Outcome: Progressing  Flowsheets (Taken 6/17/2024 0608)  Prevent/manage excess moisture: Cleanse incontinence/protect with barrier cream  Goal: Prevent/minimize sheer/friction injuries  Outcome: Progressing  Flowsheets (Taken 6/17/2024 0608)  Prevent/minimize sheer/friction injuries:   Complete micro-shifts as needed if patient unable. Adjust patient position to relieve pressure points, not a full turn   HOB 30 degrees or less   Turn/reposition every 2 hours/use positioning/transfer devices   Use pull sheet       The clinical goals for the shift include remain hemodynamically stable during shift; improvement in Hgb

## 2024-06-18 DIAGNOSIS — R15.9 INCONTINENCE OF FECES, UNSPECIFIED FECAL INCONTINENCE TYPE: Primary | ICD-10-CM

## 2024-06-18 LAB
ANION GAP SERPL CALC-SCNC: 15 MMOL/L (ref 10–20)
BUN SERPL-MCNC: 51 MG/DL (ref 6–23)
CALCIUM SERPL-MCNC: 8.3 MG/DL (ref 8.6–10.3)
CARDIAC TROPONIN I PNL SERPL HS: 20 NG/L (ref 0–13)
CHLORIDE SERPL-SCNC: 114 MMOL/L (ref 98–107)
CO2 SERPL-SCNC: 18 MMOL/L (ref 21–32)
CREAT SERPL-MCNC: 1.58 MG/DL (ref 0.5–1.05)
EGFRCR SERPLBLD CKD-EPI 2021: 32 ML/MIN/1.73M*2
ERYTHROCYTE [DISTWIDTH] IN BLOOD BY AUTOMATED COUNT: 15.5 % (ref 11.5–14.5)
GLUCOSE BLD MANUAL STRIP-MCNC: 116 MG/DL (ref 74–99)
GLUCOSE BLD MANUAL STRIP-MCNC: 117 MG/DL (ref 74–99)
GLUCOSE BLD MANUAL STRIP-MCNC: 120 MG/DL (ref 74–99)
GLUCOSE BLD MANUAL STRIP-MCNC: 120 MG/DL (ref 74–99)
GLUCOSE BLD MANUAL STRIP-MCNC: 139 MG/DL (ref 74–99)
GLUCOSE BLD MANUAL STRIP-MCNC: 140 MG/DL (ref 74–99)
GLUCOSE BLD MANUAL STRIP-MCNC: 145 MG/DL (ref 74–99)
GLUCOSE SERPL-MCNC: 119 MG/DL (ref 74–99)
HCT VFR BLD AUTO: 22.1 % (ref 36–46)
HCT VFR BLD AUTO: 23.1 % (ref 36–46)
HCT VFR BLD AUTO: 24.7 % (ref 36–46)
HGB BLD-MCNC: 7 G/DL (ref 12–16)
HGB BLD-MCNC: 7.3 G/DL (ref 12–16)
HGB BLD-MCNC: 7.5 G/DL (ref 12–16)
HOLD SPECIMEN: NORMAL
MAGNESIUM SERPL-MCNC: 1.71 MG/DL (ref 1.6–2.4)
MCH RBC QN AUTO: 28.2 PG (ref 26–34)
MCHC RBC AUTO-ENTMCNC: 30.4 G/DL (ref 32–36)
MCV RBC AUTO: 93 FL (ref 80–100)
NRBC BLD-RTO: 0 /100 WBCS (ref 0–0)
PLATELET # BLD AUTO: 207 X10*3/UL (ref 150–450)
POTASSIUM SERPL-SCNC: 4.6 MMOL/L (ref 3.5–5.3)
RBC # BLD AUTO: 2.66 X10*6/UL (ref 4–5.2)
SODIUM SERPL-SCNC: 142 MMOL/L (ref 136–145)
WBC # BLD AUTO: 8.1 X10*3/UL (ref 4.4–11.3)

## 2024-06-18 PROCEDURE — 85014 HEMATOCRIT: CPT | Performed by: STUDENT IN AN ORGANIZED HEALTH CARE EDUCATION/TRAINING PROGRAM

## 2024-06-18 PROCEDURE — 82947 ASSAY GLUCOSE BLOOD QUANT: CPT

## 2024-06-18 PROCEDURE — 2500000002 HC RX 250 W HCPCS SELF ADMINISTERED DRUGS (ALT 637 FOR MEDICARE OP, ALT 636 FOR OP/ED): Mod: MUE | Performed by: INTERNAL MEDICINE

## 2024-06-18 PROCEDURE — 99232 SBSQ HOSP IP/OBS MODERATE 35: CPT | Performed by: INTERNAL MEDICINE

## 2024-06-18 PROCEDURE — 36415 COLL VENOUS BLD VENIPUNCTURE: CPT | Performed by: STUDENT IN AN ORGANIZED HEALTH CARE EDUCATION/TRAINING PROGRAM

## 2024-06-18 PROCEDURE — 2500000002 HC RX 250 W HCPCS SELF ADMINISTERED DRUGS (ALT 637 FOR MEDICARE OP, ALT 636 FOR OP/ED): Performed by: STUDENT IN AN ORGANIZED HEALTH CARE EDUCATION/TRAINING PROGRAM

## 2024-06-18 PROCEDURE — 83735 ASSAY OF MAGNESIUM: CPT | Performed by: STUDENT IN AN ORGANIZED HEALTH CARE EDUCATION/TRAINING PROGRAM

## 2024-06-18 PROCEDURE — 82947 ASSAY GLUCOSE BLOOD QUANT: CPT | Mod: 91

## 2024-06-18 PROCEDURE — 2500000004 HC RX 250 GENERAL PHARMACY W/ HCPCS (ALT 636 FOR OP/ED): Performed by: INTERNAL MEDICINE

## 2024-06-18 PROCEDURE — 85027 COMPLETE CBC AUTOMATED: CPT | Performed by: INTERNAL MEDICINE

## 2024-06-18 PROCEDURE — 82374 ASSAY BLOOD CARBON DIOXIDE: CPT | Performed by: INTERNAL MEDICINE

## 2024-06-18 PROCEDURE — 2060000001 HC INTERMEDIATE ICU ROOM DAILY

## 2024-06-18 PROCEDURE — 2500000001 HC RX 250 WO HCPCS SELF ADMINISTERED DRUGS (ALT 637 FOR MEDICARE OP): Performed by: STUDENT IN AN ORGANIZED HEALTH CARE EDUCATION/TRAINING PROGRAM

## 2024-06-18 PROCEDURE — C9113 INJ PANTOPRAZOLE SODIUM, VIA: HCPCS | Performed by: INTERNAL MEDICINE

## 2024-06-18 PROCEDURE — 84484 ASSAY OF TROPONIN QUANT: CPT | Performed by: STUDENT IN AN ORGANIZED HEALTH CARE EDUCATION/TRAINING PROGRAM

## 2024-06-18 PROCEDURE — 82947 ASSAY GLUCOSE BLOOD QUANT: CPT | Mod: 91,MUE

## 2024-06-18 RX ORDER — SUCRALFATE 1 G/10ML
1 SUSPENSION ORAL 3 TIMES DAILY
Status: DISCONTINUED | OUTPATIENT
Start: 2024-06-18 | End: 2024-06-19 | Stop reason: HOSPADM

## 2024-06-18 RX ORDER — PANTOPRAZOLE SODIUM 40 MG/1
40 TABLET, DELAYED RELEASE ORAL
Status: DISCONTINUED | OUTPATIENT
Start: 2024-06-18 | End: 2024-06-19 | Stop reason: HOSPADM

## 2024-06-18 RX ADMIN — PANTOPRAZOLE SODIUM 8 MG/HR: 40 INJECTION, POWDER, FOR SOLUTION INTRAVENOUS at 04:20

## 2024-06-18 RX ADMIN — SUCRALFATE 1 G: 1 SUSPENSION ORAL at 11:00

## 2024-06-18 RX ADMIN — PANTOPRAZOLE SODIUM 40 MG: 40 TABLET, DELAYED RELEASE ORAL at 17:00

## 2024-06-18 RX ADMIN — SERTRALINE HYDROCHLORIDE 50 MG: 50 TABLET ORAL at 08:40

## 2024-06-18 RX ADMIN — ROSUVASTATIN CALCIUM 40 MG: 10 TABLET, FILM COATED ORAL at 08:40

## 2024-06-18 RX ADMIN — SUCRALFATE 1 G: 1 SUSPENSION ORAL at 20:24

## 2024-06-18 RX ADMIN — SUCRALFATE 1 G: 1 SUSPENSION ORAL at 17:00

## 2024-06-18 ASSESSMENT — PAIN SCALES - GENERAL: PAINLEVEL_OUTOF10: 0 - NO PAIN

## 2024-06-18 ASSESSMENT — COGNITIVE AND FUNCTIONAL STATUS - GENERAL
TURNING FROM BACK TO SIDE WHILE IN FLAT BAD: A LOT
DRESSING REGULAR LOWER BODY CLOTHING: TOTAL
CLIMB 3 TO 5 STEPS WITH RAILING: TOTAL
EATING MEALS: A LITTLE
WALKING IN HOSPITAL ROOM: A LOT
MOVING FROM LYING ON BACK TO SITTING ON SIDE OF FLAT BED WITH BEDRAILS: A LOT
STANDING UP FROM CHAIR USING ARMS: A LOT
DRESSING REGULAR UPPER BODY CLOTHING: A LOT
PERSONAL GROOMING: A LITTLE
MOVING TO AND FROM BED TO CHAIR: A LOT
HELP NEEDED FOR BATHING: A LOT
DAILY ACTIVITIY SCORE: 12
MOBILITY SCORE: 11
TOILETING: TOTAL

## 2024-06-18 ASSESSMENT — PAIN - FUNCTIONAL ASSESSMENT: PAIN_FUNCTIONAL_ASSESSMENT: 0-10

## 2024-06-18 NOTE — PROGRESS NOTES
Vivienne Li is a 83 y.o. female on day 2 of admission presenting with Hematemesis, unspecified whether nausea present.      Subjective   Seen at bedside.  Macedonian speaking.  Daughter at bedside to help with translation.  Patient tolerated diet today. More tired than usual, sleeping more per her daughter. On room air SO2 95%. HR 81 on monitor. At home her mobility is limited.  Uses a walker but does not walk much. Hb dropped to 7.0 this afternoon.          Objective     Last Recorded Vitals  /60   Pulse 87   Temp 36.4 °C (97.5 °F)   Resp 16   Wt 93 kg (205 lb 0.4 oz)   SpO2 97%   Intake/Output last 3 Shifts:    Intake/Output Summary (Last 24 hours) at 6/18/2024 1604  Last data filed at 6/18/2024 0500  Gross per 24 hour   Intake 843 ml   Output 600 ml   Net 243 ml       Admission Weight  Weight: 93 kg (205 lb 0.4 oz) (06/17/24 0037)    Daily Weight  06/17/24 : 93 kg (205 lb 0.4 oz)    Image Results  Esophagogastroduodenoscopy (EGD)  Table formatting from the original result was not included.  Impression  The cricopharynx, upper third of the esophagus, middle third of the   esophagus, lower third of the esophagus and GE junction appeared normal.  5 small and large, ulcers in the stomach with clean base (Ru III) 3   ulcers in the antrum were large and the margins were biopsied.  The other   ulcers were in the posterior wall and the greater curvature and were   superficial in nature.  The biopsies were taken for from the antral area.    I could not biopsy the rest of the ulcers because the examination had to   be discontinued at the request anesthesiology because of laryngeal spasm  Performed forceps biopsies in the stomach  The duodenal bulb, 1st part of the duodenum and 2nd part of the duodenum   appeared normal.    Findings  The cricopharynx, upper third of the esophagus, middle third of the   esophagus, lower third of the esophagus and GE junction appeared normal.  5 small and large, cratered,  irregular ulcers in the stomach with clean   base (Ru III)  Performed forceps biopsies in the stomach  The duodenal bulb, 1st part of the duodenum and 2nd part of the duodenum   appeared normal.    Recommendation   Follow up with PCP     Indication  Hematemesis, unspecified whether nausea present    Staff  Staff Role   Wilmar Che MD Proceduralist     Medications  See Anesthesia Record.     Preprocedure  A history and physical has been performed, and patient medication   allergies have been reviewed. The patient's tolerance of previous   anesthesia has been reviewed. The risks and benefits of the procedure and   the sedation options and risks were discussed with the patient. All   questions were answered and informed consent obtained.    Details of the Procedure  The patient underwent monitored anesthesia care, which was administered by   an anesthesia professional. The patient's blood pressure, ECG, ETCO2,   heart rate, level of consciousness, oxygen and respirations were monitored   throughout the procedure. The scope was introduced through the mouth and   advanced to the second part of the duodenum. Retroflexion was performed in   the cardia. Prior to the procedure, the patient's H. Pylori status was   unknown. The patient experienced no blood loss. The procedure was not   difficult. The patient tolerated the procedure well. There were no   apparent adverse events.     Events  Procedure Events   Event Event Time   ENDO SCOPE IN TIME 6/17/2024  4:28 PM   ENDO SCOPE OUT TIME 6/17/2024  4:33 PM   ENDO SCOPE IN TIME 6/17/2024  4:36 PM   ENDO SCOPE OUT TIME 6/17/2024  4:38 PM     Specimens  ID Type Source Tests Collected by Time   1 : COLD BX/MULTIPLE ANTRUM ULCER BXS Tissue STOMACH ANTRUM BIOPSY   SURGICAL PATHOLOGY EXAM Wilmar Che MD 6/17/2024 1636     Procedure Location  [facility] [address]  [address] [facility] [address]  [phone]    Referring Provider  Simeon Cheung,  MD    Procedure Provider  Wilmar Che MD  ECG 12 lead  Normal sinus rhythm  Normal ECG  No previous ECGs available        Physical Exam  General: somnolent but arousable.  Able to communicate with her daughter in Sami. No acute distress.  HEENT: oral mucosa moist, EOMI.  CHEST: clear breath sounds, no crackles, no wheeze, no tachypnea.  CVS: regular rate and rhythm, systolic murmur 2/6.   ABD: Soft, Non Tender, BS present.  EXT: no edema.  SKIN: no rash.  NEURO: Nonfocal grossly.      Assessment/Plan   This patient currently has cardiac telemetry ordered; if you would like to modify or discontinue the telemetry order, click here to go to the orders activity to modify/discontinue the order.    GI bleed, upper   -Admitted due to hematemesis. History of gastritis.  -Using NSAIDs due to OA, naproxen.  -CT AP chest abdomen: Acute gastritis, diverticulosis, hepatic steatosis.  -EGD on 6/17/24: 5 small and large, cratered, irregular ulcers in the stomach with clean base (Ru III)   -discontinued pantoprazole drip, started on pantoprazole p.o. 40 mg twice daily and sucralfate 1 g 3 times daily    Acute on chronic anemia 2/2 blood loss  -Baseline hemoglobin 11-13, down to 6.8  -Received 3 RBCp  -Monitor H&H, transfuse if hemoglobin is below 7    Elevated troponin, demand ischemia  -Denied chest pain.  EKG no signs of ischemia. Troponin: 15, 17, 23    CKD 3b  -Creatinine 1.5  -Trend BMP    Type 2 diabetes  -Home meds metformin 1 g twice daily  -Continue with ISS# 1    Hypertension  -Holding home amlodipine 10 mg daily, HCTZ 25 mg daily, metoprolol 100 mg daily    Dyslipidemia  -Continue with home med Crestor 40 mg daily    Osteoarthritis, BL knees  -Holding home naproxen    Dementia  Mood disorder  -Continue home sertraline 50 mg daily  -Continue home mirtazapine 15 mg daily    Left lung nodule  -4 mm, follow-up as an outpatient    Adrenal nodules  -14 mm right adrenal nodule and mild nodularity of the left  gland  -Needs follow-up as an outpatient        Diet: Full liquid  Lines/Devices: peripheral   Drips: Pantoprazole  ATBx: -  PPX: Pantoprazole and SCDs  Code: Full code  Dispo: Telemetry bed  Discharge planning: Patient lives with her daughter.  Daughter wants her to go back home with home health, likely tomorrow if hemoglobin is a stable.      *This document was created using dragon dictation and may contain unintended error.           Adia Partida MD

## 2024-06-18 NOTE — CARE PLAN
Problem: Discharge Planning  Goal: Discharge to home or other facility with appropriate resources  Outcome: Progressing     Problem: Pain - Adult  Goal: Verbalizes/displays adequate comfort level or baseline comfort level  Outcome: Met     Problem: Safety - Adult  Goal: Free from fall injury  Outcome: Met     Problem: Chronic Conditions and Co-morbidities  Goal: Patient's chronic conditions and co-morbidity symptoms are monitored and maintained or improved  Outcome: Met     Problem: Fall/Injury  Goal: Not fall by end of shift  Outcome: Met  Goal: Be free from injury by end of the shift  Outcome: Met     Problem: Skin  Goal: Prevent/manage excess moisture  Outcome: Met  Goal: Prevent/minimize sheer/friction injuries  Outcome: Met   The patient's goals for the shift include      The clinical goals for the shift include Pt will remain hemodynamically stable throughout shift.

## 2024-06-19 VITALS
HEART RATE: 83 BPM | RESPIRATION RATE: 16 BRPM | OXYGEN SATURATION: 96 % | BODY MASS INDEX: 37.73 KG/M2 | HEIGHT: 62 IN | DIASTOLIC BLOOD PRESSURE: 70 MMHG | WEIGHT: 205.03 LBS | TEMPERATURE: 96.8 F | SYSTOLIC BLOOD PRESSURE: 163 MMHG

## 2024-06-19 PROBLEM — K92.0 HEMATEMESIS, UNSPECIFIED WHETHER NAUSEA PRESENT: Status: RESOLVED | Noted: 2024-06-16 | Resolved: 2024-06-19

## 2024-06-19 PROBLEM — J38.5 LARYNGOSPASM: Status: RESOLVED | Noted: 2024-06-17 | Resolved: 2024-06-19

## 2024-06-19 LAB
ANION GAP SERPL CALC-SCNC: 10 MMOL/L (ref 10–20)
BUN SERPL-MCNC: 39 MG/DL (ref 6–23)
CALCIUM SERPL-MCNC: 8.5 MG/DL (ref 8.6–10.3)
CHLORIDE SERPL-SCNC: 112 MMOL/L (ref 98–107)
CO2 SERPL-SCNC: 22 MMOL/L (ref 21–32)
CREAT SERPL-MCNC: 1.55 MG/DL (ref 0.5–1.05)
EGFRCR SERPLBLD CKD-EPI 2021: 33 ML/MIN/1.73M*2
ERYTHROCYTE [DISTWIDTH] IN BLOOD BY AUTOMATED COUNT: 15.4 % (ref 11.5–14.5)
FERRITIN SERPL-MCNC: 21 NG/ML (ref 8–150)
FOLATE SERPL-MCNC: 18.4 NG/ML
GLUCOSE BLD MANUAL STRIP-MCNC: 115 MG/DL (ref 74–99)
GLUCOSE BLD MANUAL STRIP-MCNC: 117 MG/DL (ref 74–99)
GLUCOSE BLD MANUAL STRIP-MCNC: 136 MG/DL (ref 74–99)
GLUCOSE BLD MANUAL STRIP-MCNC: 137 MG/DL (ref 74–99)
GLUCOSE SERPL-MCNC: 123 MG/DL (ref 74–99)
HCT VFR BLD AUTO: 23.9 % (ref 36–46)
HGB BLD-MCNC: 7.4 G/DL (ref 12–16)
IRON SATN MFR SERPL: 7 % (ref 25–45)
IRON SERPL-MCNC: 16 UG/DL (ref 35–150)
MCH RBC QN AUTO: 28 PG (ref 26–34)
MCHC RBC AUTO-ENTMCNC: 31 G/DL (ref 32–36)
MCV RBC AUTO: 91 FL (ref 80–100)
NRBC BLD-RTO: 0 /100 WBCS (ref 0–0)
PLATELET # BLD AUTO: 233 X10*3/UL (ref 150–450)
POTASSIUM SERPL-SCNC: 4.3 MMOL/L (ref 3.5–5.3)
RBC # BLD AUTO: 2.64 X10*6/UL (ref 4–5.2)
SODIUM SERPL-SCNC: 140 MMOL/L (ref 136–145)
TIBC SERPL-MCNC: 228 UG/DL (ref 240–445)
UIBC SERPL-MCNC: 212 UG/DL (ref 110–370)
VIT B12 SERPL-MCNC: 451 PG/ML (ref 211–911)
WBC # BLD AUTO: 7.2 X10*3/UL (ref 4.4–11.3)

## 2024-06-19 PROCEDURE — 85027 COMPLETE CBC AUTOMATED: CPT | Performed by: INTERNAL MEDICINE

## 2024-06-19 PROCEDURE — 2500000002 HC RX 250 W HCPCS SELF ADMINISTERED DRUGS (ALT 637 FOR MEDICARE OP, ALT 636 FOR OP/ED): Performed by: STUDENT IN AN ORGANIZED HEALTH CARE EDUCATION/TRAINING PROGRAM

## 2024-06-19 PROCEDURE — 83540 ASSAY OF IRON: CPT | Performed by: STUDENT IN AN ORGANIZED HEALTH CARE EDUCATION/TRAINING PROGRAM

## 2024-06-19 PROCEDURE — 99239 HOSP IP/OBS DSCHRG MGMT >30: CPT | Performed by: INTERNAL MEDICINE

## 2024-06-19 PROCEDURE — 2500000004 HC RX 250 GENERAL PHARMACY W/ HCPCS (ALT 636 FOR OP/ED): Performed by: STUDENT IN AN ORGANIZED HEALTH CARE EDUCATION/TRAINING PROGRAM

## 2024-06-19 PROCEDURE — 82746 ASSAY OF FOLIC ACID SERUM: CPT | Mod: PARLAB | Performed by: STUDENT IN AN ORGANIZED HEALTH CARE EDUCATION/TRAINING PROGRAM

## 2024-06-19 PROCEDURE — 97166 OT EVAL MOD COMPLEX 45 MIN: CPT | Mod: GO

## 2024-06-19 PROCEDURE — 97162 PT EVAL MOD COMPLEX 30 MIN: CPT | Mod: GP

## 2024-06-19 PROCEDURE — 82607 VITAMIN B-12: CPT | Mod: PARLAB | Performed by: STUDENT IN AN ORGANIZED HEALTH CARE EDUCATION/TRAINING PROGRAM

## 2024-06-19 PROCEDURE — 82947 ASSAY GLUCOSE BLOOD QUANT: CPT

## 2024-06-19 PROCEDURE — 82728 ASSAY OF FERRITIN: CPT | Mod: PARLAB | Performed by: STUDENT IN AN ORGANIZED HEALTH CARE EDUCATION/TRAINING PROGRAM

## 2024-06-19 PROCEDURE — 2500000002 HC RX 250 W HCPCS SELF ADMINISTERED DRUGS (ALT 637 FOR MEDICARE OP, ALT 636 FOR OP/ED): Performed by: INTERNAL MEDICINE

## 2024-06-19 PROCEDURE — 80048 BASIC METABOLIC PNL TOTAL CA: CPT | Performed by: INTERNAL MEDICINE

## 2024-06-19 PROCEDURE — 36415 COLL VENOUS BLD VENIPUNCTURE: CPT | Performed by: INTERNAL MEDICINE

## 2024-06-19 PROCEDURE — 2500000001 HC RX 250 WO HCPCS SELF ADMINISTERED DRUGS (ALT 637 FOR MEDICARE OP): Performed by: STUDENT IN AN ORGANIZED HEALTH CARE EDUCATION/TRAINING PROGRAM

## 2024-06-19 PROCEDURE — 97535 SELF CARE MNGMENT TRAINING: CPT | Mod: GP,CQ

## 2024-06-19 RX ORDER — DIPHENHYDRAMINE HYDROCHLORIDE 50 MG/ML
50 INJECTION INTRAMUSCULAR; INTRAVENOUS EVERY 5 MIN PRN
Status: DISCONTINUED | OUTPATIENT
Start: 2024-06-19 | End: 2024-06-19 | Stop reason: HOSPADM

## 2024-06-19 RX ORDER — SUCRALFATE 1 G/10ML
1 SUSPENSION ORAL 3 TIMES DAILY
Qty: 210 ML | Refills: 0 | Status: ON HOLD | OUTPATIENT
Start: 2024-06-19 | End: 2024-06-30

## 2024-06-19 RX ORDER — FERROUS SULFATE 324(65)MG
65 TABLET, DELAYED RELEASE (ENTERIC COATED) ORAL
Qty: 12 TABLET | Refills: 1 | Status: SHIPPED | OUTPATIENT
Start: 2024-06-19 | End: 2024-08-18

## 2024-06-19 RX ORDER — PANTOPRAZOLE SODIUM 40 MG/1
40 TABLET, DELAYED RELEASE ORAL
Qty: 30 TABLET | Refills: 1 | Status: SHIPPED | OUTPATIENT
Start: 2024-06-19 | End: 2024-08-18

## 2024-06-19 RX ORDER — MULTIVITAMIN
1 TABLET ORAL DAILY
Qty: 30 TABLET | Refills: 0 | Status: SHIPPED | OUTPATIENT
Start: 2024-06-19 | End: 2024-07-19

## 2024-06-19 RX ORDER — ACETAMINOPHEN 325 MG/1
650 TABLET ORAL EVERY 4 HOURS PRN
Qty: 30 TABLET | Refills: 0 | Status: SHIPPED | OUTPATIENT
Start: 2024-06-19

## 2024-06-19 RX ADMIN — PANTOPRAZOLE SODIUM 40 MG: 40 TABLET, DELAYED RELEASE ORAL at 06:38

## 2024-06-19 RX ADMIN — SUCRALFATE 1 G: 1 SUSPENSION ORAL at 09:42

## 2024-06-19 RX ADMIN — IRON SUCROSE 300 MG: 20 INJECTION, SOLUTION INTRAVENOUS at 09:43

## 2024-06-19 RX ADMIN — ROSUVASTATIN CALCIUM 40 MG: 10 TABLET, FILM COATED ORAL at 09:42

## 2024-06-19 RX ADMIN — SERTRALINE HYDROCHLORIDE 50 MG: 50 TABLET ORAL at 09:42

## 2024-06-19 ASSESSMENT — COGNITIVE AND FUNCTIONAL STATUS - GENERAL
WALKING IN HOSPITAL ROOM: TOTAL
TURNING FROM BACK TO SIDE WHILE IN FLAT BAD: A LOT
HELP NEEDED FOR BATHING: TOTAL
MOVING FROM LYING ON BACK TO SITTING ON SIDE OF FLAT BED WITH BEDRAILS: A LOT
STANDING UP FROM CHAIR USING ARMS: A LOT
DRESSING REGULAR LOWER BODY CLOTHING: TOTAL
MOVING TO AND FROM BED TO CHAIR: TOTAL
TOILETING: TOTAL
DAILY ACTIVITIY SCORE: 11
CLIMB 3 TO 5 STEPS WITH RAILING: TOTAL
PERSONAL GROOMING: A LOT
MOVING TO AND FROM BED TO CHAIR: TOTAL
MOBILITY SCORE: 8
MOVING FROM LYING ON BACK TO SITTING ON SIDE OF FLAT BED WITH BEDRAILS: A LOT
DRESSING REGULAR UPPER BODY CLOTHING: A LOT
TURNING FROM BACK TO SIDE WHILE IN FLAT BAD: A LOT
STANDING UP FROM CHAIR USING ARMS: TOTAL
CLIMB 3 TO 5 STEPS WITH RAILING: TOTAL
WALKING IN HOSPITAL ROOM: TOTAL
MOBILITY SCORE: 9

## 2024-06-19 ASSESSMENT — PAIN - FUNCTIONAL ASSESSMENT
PAIN_FUNCTIONAL_ASSESSMENT: 0-10
PAIN_FUNCTIONAL_ASSESSMENT: 0-10

## 2024-06-19 ASSESSMENT — PAIN SCALES - GENERAL
PAINLEVEL_OUTOF10: 0 - NO PAIN
PAINLEVEL_OUTOF10: 0 - NO PAIN

## 2024-06-19 NOTE — PROGRESS NOTES
Called and spoke with patients daughter regarding discharge plan. Patients daughter has declined skilled nursing facility at discharge. Confirmed with patients daughter preference for patient to return home at discharge. Offered home health care for therapy, patient daughter has declined home health care. Patient daughter confirms patient has daily home health aide at home. Patients daughter declined any home going needs.

## 2024-06-19 NOTE — PROGRESS NOTES
"Vivienne Li is a 83 y.o. female on day 2 of admission presenting with Hematemesis, unspecified whether nausea present.    Subjective   6/18/2024 status post EGD yesterday.  Appears little more animated today but still tired and sleepy per daughter at bedside.  H&H slightly down to 7.0 today after 7.5 in a.m. but no reported GI bleed, no vomiting/hematemesis and no stool today.  Denies any abdominal discomfort.  Abdomen soft, nontender, bowel sounds present.  Tolerates full liquid diet okay       Objective     A 10 point review of system is negative except for what is mentioned in the HPI     The note was created using voice recognition transcription software. Despite proofreading, unintentional typographical errors may be present. Please contact the GI office with any questions or concerns.      Current Medications: reviewed  Vital Signs: Reviewed     Physical Exam:  General: no apparent distress, sleepy  Skin:  Warm and dry, no jaundice  HEENT: No scleral icterus, no conjunctival pallor, normocephalic, atraumatic, mucous membranes moist  Neck:  atraumatic, trachea midline, no JVD  Chest:  decreased air entry to auscultation bilaterally. No wheezes, rales, or rhonchi  CV:  Regular rate and rhythm.  Positive S1/S2  Abdomen: no distension, +BS, soft, non-tender to palpation, no rebound tenderness, no guarding, no rigidity, no discernible ascites   Extremities: no lower extremity edema, Chronic pigmentary changes, no cyanosis  Neurological:  A&Ox3 , no asterixis  Psychiatric: cooperative      Investigations:  Labs, radiological imaging and cardiac work up were reviewed    Last Recorded Vitals  Blood pressure 136/64, pulse 80, temperature 36.7 °C (98.1 °F), resp. rate 18, height 1.575 m (5' 2\"), weight 93 kg (205 lb 0.4 oz), SpO2 95%.  Intake/Output last 3 Shifts:  I/O last 3 completed shifts:  In: 843 (9.1 mL/kg) [P.O.:100; I.V.:743 (8 mL/kg)]  Out: 600 (6.5 mL/kg) [Urine:600 (0.2 mL/kg/hr)]  Weight: 93 kg "     Relevant Results        Scheduled medications  insulin lispro, 0-5 Units, subcutaneous, q4h  mirtazapine, 15 mg, oral, Nightly  pantoprazole, 40 mg, oral, BID AC  rosuvastatin, 40 mg, oral, Daily  sertraline, 50 mg, oral, Daily  sucralfate, 1 g, oral, TID      Continuous medications     PRN medications  PRN medications: acetaminophen **OR** acetaminophen **OR** acetaminophen, dextrose, dextrose, glucagon, glucagon, melatonin    Results for orders placed or performed during the hospital encounter of 06/16/24 (from the past 24 hour(s))   POCT GLUCOSE   Result Value Ref Range    POCT Glucose 116 (H) 74 - 99 mg/dL   POCT GLUCOSE   Result Value Ref Range    POCT Glucose 120 (H) 74 - 99 mg/dL   CBC   Result Value Ref Range    WBC 8.1 4.4 - 11.3 x10*3/uL    nRBC 0.0 0.0 - 0.0 /100 WBCs    RBC 2.66 (L) 4.00 - 5.20 x10*6/uL    Hemoglobin 7.5 (L) 12.0 - 16.0 g/dL    Hematocrit 24.7 (L) 36.0 - 46.0 %    MCV 93 80 - 100 fL    MCH 28.2 26.0 - 34.0 pg    MCHC 30.4 (L) 32.0 - 36.0 g/dL    RDW 15.5 (H) 11.5 - 14.5 %    Platelets 207 150 - 450 x10*3/uL   Basic metabolic panel   Result Value Ref Range    Glucose 119 (H) 74 - 99 mg/dL    Sodium 142 136 - 145 mmol/L    Potassium 4.6 3.5 - 5.3 mmol/L    Chloride 114 (H) 98 - 107 mmol/L    Bicarbonate 18 (L) 21 - 32 mmol/L    Anion Gap 15 10 - 20 mmol/L    Urea Nitrogen 51 (H) 6 - 23 mg/dL    Creatinine 1.58 (H) 0.50 - 1.05 mg/dL    eGFR 32 (L) >60 mL/min/1.73m*2    Calcium 8.3 (L) 8.6 - 10.3 mg/dL   Magnesium   Result Value Ref Range    Magnesium 1.71 1.60 - 2.40 mg/dL   Troponin I, High Sensitivity   Result Value Ref Range    Troponin I, High Sensitivity 20 (H) 0 - 13 ng/L   POCT GLUCOSE   Result Value Ref Range    POCT Glucose 120 (H) 74 - 99 mg/dL   POCT GLUCOSE   Result Value Ref Range    POCT Glucose 145 (H) 74 - 99 mg/dL   SST TOP   Result Value Ref Range    Extra Tube Hold for add-ons.    Hemoglobin and Hematocrit, Blood   Result Value Ref Range    Hemoglobin 7.0 (L) 12.0 -  16.0 g/dL    Hematocrit 22.1 (L) 36.0 - 46.0 %   POCT GLUCOSE   Result Value Ref Range    POCT Glucose 139 (H) 74 - 99 mg/dL   POCT GLUCOSE   Result Value Ref Range    POCT Glucose 140 (H) 74 - 99 mg/dL       * Cannot find OR log *  Last relevant procedure:        This patient currently has cardiac telemetry ordered; if you would like to modify or discontinue the telemetry order, click here to go to the orders activity to modify/discontinue the order.    Assessment/Plan   Principal Problem:    Hematemesis, unspecified whether nausea present  Active Problems:    Laryngospasm    Vivienne Li is a 83 y.o. female  with T2DM, HTN, HLD, osteoarthritis, dementia, who presented 6/4/2016/24 with hematemesis and hypotension.      Per patient's daughter 1 episode of large-volume hematemesis, bright red with clots before admission, followed by hypotension.  Denies any hematochezia or melena.  No significant heartburn per family.  History of naproxen twice daily last months and frequent courses of prednisone.     Status post EGD yesterday, 6/17/2024 with findings of 5 small and large, cratered, irregular ulcers in the stomach with clean base, biopsies performed, esophagus and examined duodenum normal    Continue Protonix 40 mg p.o. twice daily for at least 2 months  Carafate 1 g p.o. 3 times daily for 1 month  Repeat EGD in 2 months to recheck healing     Continue daily abdominal exams, serial CBC, active type and screen on file, 2 large-bore IV access, replenish as needed, patient status post 3 units of RBCs     Medical management as per primary medicine    Patient discussed with Dr. Chinedu VIDAL spent 30 minutes in the professional and overall care of this patient.      Jes García, APRN-CNP

## 2024-06-19 NOTE — PROGRESS NOTES
Physical Therapy    Physical Therapy Evaluation    Patient Name: Vivienne Li  MRN: [id number]  Today's Date: 6/19/2024   Time Calculation  Start Time: 1206  Stop Time: 1216  Time Calculation (min): 10 min    Assessment/Plan   PT Assessment  Rehab Prognosis: Good  End of Session Communication: Bedside nurse  End of Session Patient Position: Bed, 2 rail up, Alarm on  IP OR SWING BED PT PLAN  Inpatient or Swing Bed: Inpatient  PT Plan  PT Frequency: 3 times per week  PT Discharge Recommendations: Moderate intensity level of continued care  PT Recommended Transfer Status: Assist x2  PT - OK to Discharge: Yes      Subjective   General Visit Information:  General  Reason for Referral: Acute blood loss anemia/acute gastritis  Referred By: Helga  Past Medical History Relevant to Rehab: DM,OA,HTN,dementia  Prior to Session Communication: Bedside nurse  Patient Position Received: Bed, 2 rail up, Alarm on  Home Living:  Home Living  Type of Home: House  Lives With: Adult children  Home Adaptive Equipment: Walker rolling or standard  Home Living Comments: Due to language barrier unable to assess  Prior Level of Function:  Prior Function Per Pt/Caregiver Report  Prior Function Comments: unable to assess  Precautions:  Precautions  Medical Precautions: Fall precautions  Precautions Comment:  (pt Ukranian speaking; language elvia used to translate)  Vital Signs:       Objective   Pain:     Cognition:  Cognition  Overall Cognitive Status: Unable to assess    General Assessments:    Functional Assessments:  Bed Mobility  Bed Mobility:  (max assist x 2)    Transfers  Transfer:  (unable)    Ambulation/Gait Training  Ambulation/Gait Training Performed:  (unable)  Extremity/Trunk Assessments:    Outcome Measures:  [facility] Basic Mobility  Turning from your back to your side while in a flat bed without using bedrails: A lot  Moving from lying on your back to sitting on the side of a flat bed without using bedrails: A lot  Moving to and from  bed to chair (including a wheelchair): Total  Standing up from a chair using your arms (e.g. wheelchair or bedside chair): Total  To walk in hospital room: Total  Climbing 3-5 steps with railing: Total  Basic Mobility - Total Score: 8    Encounter Problems       Encounter Problems (Active)       PT Problem       Min assist 1-2 bed mobility       Start:  06/19/24    Expected End:  07/10/24            Min assist 1-2 sit to stand from bed/chair       Start:  06/19/24    Expected End:  07/10/24            Ambulate 10 ft> min assist 1-2 wh walker       Start:  06/19/24    Expected End:  07/10/24            10-30 reps LE's/thera ex       Start:  06/19/24    Expected End:  07/10/24               Pain - Adult          Safety       LTG - Patient will adhere to hip precautions during ADL's and transfers       Start:  06/18/24            LTG - Patient will demonstrate safety requirements appropriate to situation/environment       Start:  06/18/24            LTG - Patient will utilize safety techniques       Start:  06/18/24                   Education Documentation  Mobility Training, taught by Simon Covarrubias, PT at 6/19/2024  2:33 PM.  Learner: Patient  Readiness: Acceptance  Method: Explanation  Response: Verbalizes Understanding    Education Comments  No comments found.

## 2024-06-19 NOTE — PROGRESS NOTES
Occupational Therapy    Evaluation    Patient Name: Vivienne Li  MRN: [id number]  Today's Date: 6/19/2024  Time Calculation  Start Time: 1332  Stop Time: 1350  Time Calculation (min): 18 min  814/814-B    Assessment  IP OT Assessment  OT Assessment: Patient would benefit from further OT to address ADL's and functional transfers/mobility due to high risk for falls and decline in baseline function.  This hospital visit 6/16/2024  due to 1. Hematemesis, GIB likely upper  2. Acute gastritis   3. Acute blood loss anemia 2/2 above  4. Elevated troponin, demand ischemia   5. Elevated Cr, ?CKD  Prognosis: Good  End of Session Communication: Bedside nurse (NP)  End of Session Patient Position: Bed, 2 rail up, Alarm on; call-light within reach    Plan:  Treatment Interventions: ADL retraining, Functional transfer training, Patient/family training, Equipment evaluation/education, Compensatory technique education (energy conservation / diaphragmatic breathing techniques)  OT Frequency: 3 times per week  OT Discharge Recommendations: Moderate intensity level of continued care  OT - OK to Discharge: Yes (to next level of care when medically cleared by physician/medical team)    Subjective     Current Problem:  1. Hematemesis, unspecified whether nausea present  Esophagogastroduodenoscopy (EGD)    Esophagogastroduodenoscopy (EGD)    Surgical Pathology Exam    Surgical Pathology Exam      2. Hypotension, unspecified hypotension type  Surgical Pathology Exam    Surgical Pathology Exam      3. Lung nodule  Referral to [facility]    Surgical Pathology Exam    Surgical Pathology Exam      4. Arthralgia of knee, unspecified laterality  acetaminophen (Tylenol) 325 mg tablet      5. Gastric cardia ulcer        6. Multiple gastric ulcers  pantoprazole (ProtoNix) 40 mg EC tablet    sucralfate (Carafate) 100 mg/mL suspension    CBC and Auto Differential      7. Iron deficiency anemia due to chronic blood loss  multivitamin tablet     ferrous sulfate 324 mg (65 mg elemental iron) EC tablet (delayed release)        General:  General  Reason for Referral: safety assessment  Referred By: Jordan Luz DO  Past Medical History Relevant to Rehab: DM, OA, HTN, dementia, hypotension, HLD  Co-Treatment: PT (co-session)  Prior to Session Communication: Bedside nurse who confirmed that patient is medically stable to participate in this OT session  Patient Position Received: Bed, 2 rail up, Alarm on  General Comment: Patient seen in room 814 at 12:05-12:20 to initiate eval then this therapist returned to complete at 13:32-13:50.  Patient was cooperative.    Precautions:  Medical Precautions: Fall precautions  Precautions Comment: Primary language:  Ukranian    Pain:  Pain Assessment  Pain Assessment: 0-10  0-10 (Numeric) Pain Score: 0 - No pain    Objective   Cognition:  Overall Cognitive Status:  (able to follow one-step commands with cues/demonstration/translation)     Home Living:  Type of Home: House  Lives With: Adult children  Home Adaptive Equipment: Walker rolling or standard  Home Living Comments: Per granddaughter:  patient has assistance as needed upon hospital discharge     Prior Function:  Level of [place]:  (per granddaughter:  assist of one from caregiver)    ADL:  ADL Comments: Anticipate extensive assist especially for lower body.  To further address in OT sessions using adaptive equipment/assistive techniques to promote indep. and ease in performance    Bed Mobility/Transfers:   Bed Mobility  Bed Mobility: Yes  Bed Mobility 1  Bed Mobility 1: Supine to sitting, Sitting to supine  Level of Assistance 1: Maximum assistance, +2, Maximum verbal cues, Maximum tactile cues  Bed Mobility Comments 1: HOB elevates supine to sit  Transfers  Transfer: Yes  Transfer 1  Transfer From 1: Sit to, Stand to  Transfer to 1: Bed  Transfer Device 1: Walker  Transfer Level of Assistance 1: Maximum assistance, +2, Maximum verbal cues, Maximum  tactile cues    Ambulation/Gait Training:  Functional Mobility  Functional Mobility Performed: Yes  Functional Mobility 1  Device 1: Rolling walker  Functional Mobility Support Devices: Gait belt  Assistance 1: Maximum assistance, Maximum verbal cues, Maximum tactile cues (of 2 staff)  Comments 1: performed one sidestep along hospital bed    Sitting Balance:  Static Sitting Balance  Static Sitting-Level of Assistance: Contact guard    Standing Balance:  Static Standing Balance  Static Standing-Level of Assistance: Dependent (poor)    Extremities: RUE   RUE : Within Functional Limits (AAROM shoulder, elbow; gross  4/5) and LUE   LUE: Within Functional Limits (AAROM shoulder, elbow; gross  4/5)    Outcome Measures: [facility] Daily Activity  Putting on and taking off regular lower body clothing: Total  Bathing (including washing, rinsing, drying): Total  Putting on and taking off regular upper body clothing: A lot  Toileting, which includes using toilet, bedpan or urinal: Total  Taking care of personal grooming such as brushing teeth: A lot  Eating Meals: None  Daily Activity - Total Score: 11      EDUCATION:  Education Documentation  Body Mechanics, taught by Wilfrid Dasilva OT at 6/19/2024  4:31 PM.  Learner: Patient  Readiness: Acceptance  Method: Explanation  Response: Demonstrated Understanding, Needs Reinforcement  Comment: initiated instruction in proper mobility techniques to promote safety    Goals:   Encounter Problems       Encounter Problems (Active)       OT Goals       Patient will complete grooming, upper body bathing/dressing with minimal assist using adaptive equipment as needed  (Progressing)       Start:  06/19/24    Expected End:  07/10/24            Patient will perform bed mobility and functional transfers safely with minimal assist: bed, chair, commode using DME as needed  (Progressing)       Start:  06/19/24    Expected End:  07/10/24            Patient will apply energy  conservation/diaphragmatic breathing techniques to ADL's and functional transfers with minimal cues  (Progressing)       Start:  06/19/24    Expected End:  07/10/24               OT Goals       Patient will tolerate standing for 3 mins. and show overall fair (+) standing balance during ADL's and functional transfers/mobility  (Progressing)       Start:  06/19/24

## 2024-06-19 NOTE — CARE PLAN
Problem: Discharge Planning  Goal: Discharge to home or other facility with appropriate resources  Outcome: Progressing     Problem: Skin  Goal: Participates in plan/prevention/treatment measures  Outcome: Met  Goal: Prevent/manage excess moisture  Outcome: Met  Goal: Prevent/minimize sheer/friction injuries  Outcome: Met  Goal: Promote/optimize nutrition  Outcome: Met  Goal: Promote skin healing  Outcome: Met   The patient's goals for the shift include      The clinical goals for the shift include Pt will remain hemodynamically stable throughout shift.

## 2024-06-19 NOTE — PROGRESS NOTES
Physical Therapy    Physical Therapy Treatment    Patient Name: Vivienne Li  MRN: [id number]  Today's Date: 6/19/2024  Time Calculation  Start Time: 1331  Stop Time: 1348  Time Calculation (min): 17 min  814-B    Assessment/Plan   PT Assessment  End of Session Communication: Bedside nurse  End of Session Patient Position: Bed, 2 rail up, Alarm on     PT Plan  PT Frequency: 3 times per week  PT Discharge Recommendations: Moderate intensity level of continued care  PT Recommended Transfer Status: Assist x2  PT - OK to Discharge: Yes      General Visit Information:   PT  Visit  PT Received On: 06/19/24  General  Prior to Session Communication: Bedside nurse  Patient Position Received: Bed, 2 rail up, Alarm on  General Comment:  (pt agreeable to participate in therapy session.  no family at bedside.)    Subjective   Precautions:  Precautions  Medical Precautions: Fall precautions  Precautions Comment:  (pt Ukranian speaking; language elvia used to translate)    Vital Signs:  Vital Signs  Heart Rate: 83  SpO2: 96 %    Objective   Pain:  Pain Assessment  Pain Assessment:  (no indications of pain at this time)     Treatments:  Bed Mobility  Bed Mobility:  (sup <> sit with max A x 2 using bedrail and drawsheet to assist.  pt initially needing max A for sitting EOB though able to quickly progress to SBA)    Ambulation/Gait Training  Ambulation/Gait Training Performed:  (pt able to take one sidestep L along EOB with FWW and max A x 2.  cuing for sequencing.  pt attempted taking a second step however unable and immediately sitting back down on bed.)    Transfers  Transfer:  (sit <> stand with FWW and max A x 2.  pt pulling up from walker handles)    Outcome Measures:  [facility] Basic Mobility  Turning from your back to your side while in a flat bed without using bedrails: A lot  Moving from lying on your back to sitting on the side of a flat bed without using bedrails: A lot  Moving to and from bed to chair (including a  wheelchair): Total  Standing up from a chair using your arms (e.g. wheelchair or bedside chair): A lot  To walk in hospital room: Total  Climbing 3-5 steps with railing: Total  Basic Mobility - Total Score: 9    Education Documentation  Mobility Training, taught by Martha Collazo PTA at 6/19/2024  2:18 PM.  Learner: Patient  Readiness: Acceptance  Method: Explanation, Demonstration  Response: Needs Reinforcement    Education Comments  No comments found.        EDUCATION:       Encounter Problems       Encounter Problems (Active)       PT Problem       Min assist 1-2 bed mobility (Progressing)       Start:  06/19/24    Expected End:  07/10/24            Min assist 1-2 sit to stand from bed/chair (Progressing)       Start:  06/19/24    Expected End:  07/10/24            Ambulate 10 ft> min assist 1-2 wh walker (Progressing)       Start:  06/19/24    Expected End:  07/10/24            10-30 reps LE's/thera ex (Progressing)       Start:  06/19/24    Expected End:  07/10/24

## 2024-06-19 NOTE — DISCHARGE SUMMARY
Discharge Diagnosis  GI bleed, upper s/p EGD on 6/17/2024 gastric ulcers Ru 3  Acute on chronic iron deficiency anemia secondary to blood loss  Elevated troponin secondary to demand ischemia  Chronic kidney disease stage IIIb  Diabetes type 2  Hypertension  Dyslipidemia  Osteoarthritis, BL knees  Dementia with mood disorder  Left lung nodule, 4 mm, referred to lung nodule clinic  Adrenal nodules, incidental finding, needs follow-up with PCP  Cholelithiasis on imaging, needs follow-up with PCP  Systolic murmur on auscultation    Issues Requiring Follow-Up  PCP in 1 to 2 weeks.    Discharge Meds     Your medication list        START taking these medications        Instructions Last Dose Given Next Dose Due   acetaminophen 325 mg tablet  Commonly known as: Tylenol      Take 2 tablets (650 mg) by mouth every 4 hours if needed for mild pain (1 - 3) or moderate pain (4 - 6) (joint pain).       ferrous sulfate 324 mg (65 mg elemental iron) EC tablet (delayed release)      Take 1 tablet (65 mg) by mouth once a day on Monday, Wednesday, and Friday.       multivitamin tablet      Take 1 tablet by mouth once daily.       pantoprazole 40 mg EC tablet  Commonly known as: ProtoNix      Take 1 tablet (40 mg) by mouth once daily in the morning. Take before meals. Do not crush, chew, or split.       sucralfate 100 mg/mL suspension  Commonly known as: Carafate      Take 10 mL (1 g) by mouth 3 times a day for 7 days. One hour before meals              CHANGE how you take these medications        Instructions Last Dose Given Next Dose Due   amLODIPine 10 mg tablet  Commonly known as: Norvasc  What changed: when to take this      Take 1 tablet (10 mg) by mouth once daily.              CONTINUE taking these medications        Instructions Last Dose Given Next Dose Due   aspirin 81 mg EC tablet           blood sugar diagnostic strip      400 each by combination route 4 times a day.  Use twice a day as directed. Non-insulin dependen        enalapril 10 mg tablet  Commonly known as: Vasotec      Take 1 tablet (10 mg) by mouth once daily.       hydroCHLOROthiazide 25 mg tablet  Commonly known as: HYDRODiuril      Take 1 tablet (25 mg) by mouth once daily.       HYDROcodone-acetaminophen 5-325 mg tablet  Commonly known as: Norco      Take 1 tablet by mouth every 6 hours if needed for severe pain (7 - 10).       meclizine 12.5 mg tablet  Commonly known as: Antivert      Take 2 tablets (25 mg) by mouth 3 times a day as needed for dizziness.       melatonin 3 mg tablet      Take 1 tablet (3 mg) by mouth as needed at bedtime for sleep.       metFORMIN 1,000 mg tablet  Commonly known as: Glucophage      Take 1 tablet (1,000 mg) by mouth 2 times a day.       metoprolol succinate  mg 24 hr tablet  Commonly known as: Toprol-XL      Take 1 tablet (100 mg) by mouth once daily.       mirtazapine 15 mg tablet  Commonly known as: Remeron      Take 1 tablet (15 mg) by mouth once daily at bedtime.       polyethylene glycol 17 gram packet  Commonly known as: Glycolax, Miralax      Take 8.5 g by mouth once daily. Mix 1/2 cap (8.5g) into 4 ounces of fluid.       rosuvastatin 40 mg tablet  Commonly known as: Crestor      Take 1 tablet (40 mg) by mouth once daily.       sertraline 50 mg tablet  Commonly known as: Zoloft      Take 1 tablet (50 mg) by mouth once daily.       vortioxetine 5 mg tablet tablet  Commonly known as: Trintellix      Trintellix 5 MG Oral Tablet   Quantity: 30  Refills: 0        Start : 20-Sep-2021   Active              STOP taking these medications      methylPREDNISolone 4 mg tablets  Commonly known as: Medrol Dospak        naproxen 375 mg tablet  Commonly known as: Naprosyn                  Where to Get Your Medications        These medications were sent to [id number] - [place], [place]  [address]  [address] [facility] [id number]      Phone: [phone]   acetaminophen 325 mg tablet  ferrous sulfate 324 mg (65 mg elemental iron)  EC tablet (delayed release)  multivitamin tablet  pantoprazole 40 mg EC tablet  sucralfate 100 mg/mL suspension         Test Results Pending At Discharge  Pending Labs       Order Current Status    Ferritin In process    Surgical Pathology Exam In process            Hospital Course  ACS 83-year-old female with underlying conditions listed above who presented to the ED on 6/16/2024 after an episode of hematemesis and hypotension.  Patient was using naproxen and has a prescription for methylprednisolone due to bilateral knee pain.  Patient admitted for supportive care and GI evaluation.  Placed on Protonix IV.  Received total of 3 PRBCp and 1 dose of Venofer 300 mg. EGD completed on 6/17/2024 showing gastric ulcers.  During her hospital stay patient improved clinically, tolerating diet without emesis/hematemesis.  Hemoglobin was stable on serial H&H.  PT evaluation showing that patient needs a lot of assistance.  Per her daughter they can resume previous home health care with company of their preference for more physical therapy.  Patient can be discharged today and continue follow-up as an outpatient with PCP for a CBC in 1 week, biopsy from EGD, chronic conditions, and adrenal nodules.  Also, referred to lung nodule clinic due to left lung nodule 4 mm found in CT chest and cardiologist due to systolic murmur. Discharge medications as above.    Pertinent Physical Exam At Time of Discharge  Physical Exam  General: Alert and oriented.  Able to communicate with her daughter in [facility]. No acute distress.  HEENT: oral mucosa moist, EOMI.  CHEST: clear breath sounds, no crackles, no wheeze, no tachypnea.  CVS: regular rate and rhythm, systolic murmur 2/6.   ABD: Soft, Non Tender, BS present.  EXT: no edema.  SKIN: no rash.  NEURO: Nonfocal grossly.         Adia Partida MD

## 2024-06-20 LAB
ATRIAL RATE: 71 BPM
P AXIS: 87 DEGREES
P OFFSET: 175 MS
P ONSET: 131 MS
PR INTERVAL: 176 MS
Q ONSET: 219 MS
QRS COUNT: 11 BEATS
QRS DURATION: 84 MS
QT INTERVAL: 408 MS
QTC CALCULATION(BAZETT): 443 MS
QTC FREDERICIA: 431 MS
R AXIS: 69 DEGREES
T AXIS: 71 DEGREES
T OFFSET: 423 MS
VENTRICULAR RATE: 71 BPM

## 2024-06-27 ENCOUNTER — APPOINTMENT (OUTPATIENT)
Dept: CARDIOLOGY | Facility: HOSPITAL | Age: 84
DRG: 690 | End: 2024-06-27
Payer: MEDICARE

## 2024-06-27 ENCOUNTER — HOSPITAL ENCOUNTER (INPATIENT)
Facility: HOSPITAL | Age: 84
LOS: 1 days | Discharge: HOME HEALTH | End: 2024-06-30
Attending: STUDENT IN AN ORGANIZED HEALTH CARE EDUCATION/TRAINING PROGRAM | Admitting: INTERNAL MEDICINE
Payer: COMMERCIAL

## 2024-06-27 ENCOUNTER — APPOINTMENT (OUTPATIENT)
Dept: RADIOLOGY | Facility: HOSPITAL | Age: 84
DRG: 690 | End: 2024-06-27
Payer: MEDICARE

## 2024-06-27 ENCOUNTER — APPOINTMENT (OUTPATIENT)
Dept: PRIMARY CARE | Facility: CLINIC | Age: 84
End: 2024-06-27
Payer: COMMERCIAL

## 2024-06-27 DIAGNOSIS — K25.9 MULTIPLE GASTRIC ULCERS: ICD-10-CM

## 2024-06-27 DIAGNOSIS — N39.0 URINARY TRACT INFECTION WITHOUT HEMATURIA, SITE UNSPECIFIED: ICD-10-CM

## 2024-06-27 DIAGNOSIS — R41.82 ALTERED MENTAL STATUS, UNSPECIFIED ALTERED MENTAL STATUS TYPE: Primary | ICD-10-CM

## 2024-06-27 DIAGNOSIS — K59.09 OTHER CONSTIPATION: ICD-10-CM

## 2024-06-27 LAB
ALBUMIN SERPL BCP-MCNC: 3.6 G/DL (ref 3.4–5)
ALP SERPL-CCNC: 73 U/L (ref 33–136)
ALT SERPL W P-5'-P-CCNC: 11 U/L (ref 7–45)
ANION GAP SERPL CALC-SCNC: 13 MMOL/L (ref 10–20)
APTT PPP: 30 SECONDS (ref 27–38)
AST SERPL W P-5'-P-CCNC: 17 U/L (ref 9–39)
BASOPHILS # BLD AUTO: 0.05 X10*3/UL (ref 0–0.1)
BASOPHILS NFR BLD AUTO: 0.6 %
BILIRUB SERPL-MCNC: 0.3 MG/DL (ref 0–1.2)
BUN SERPL-MCNC: 27 MG/DL (ref 6–23)
CALCIUM SERPL-MCNC: 8.9 MG/DL (ref 8.6–10.3)
CARDIAC TROPONIN I PNL SERPL HS: 11 NG/L (ref 0–13)
CHLORIDE SERPL-SCNC: 105 MMOL/L (ref 98–107)
CO2 SERPL-SCNC: 23 MMOL/L (ref 21–32)
CREAT SERPL-MCNC: 1.5 MG/DL (ref 0.5–1.05)
EGFRCR SERPLBLD CKD-EPI 2021: 34 ML/MIN/1.73M*2
EOSINOPHIL # BLD AUTO: 0.27 X10*3/UL (ref 0–0.4)
EOSINOPHIL NFR BLD AUTO: 3.5 %
ERYTHROCYTE [DISTWIDTH] IN BLOOD BY AUTOMATED COUNT: 15.9 % (ref 11.5–14.5)
GLUCOSE SERPL-MCNC: 132 MG/DL (ref 74–99)
HCT VFR BLD AUTO: 26 % (ref 36–46)
HGB BLD-MCNC: 8.2 G/DL (ref 12–16)
IMM GRANULOCYTES # BLD AUTO: 0.04 X10*3/UL (ref 0–0.5)
IMM GRANULOCYTES NFR BLD AUTO: 0.5 % (ref 0–0.9)
INR PPP: 1.1 (ref 0.9–1.1)
LABORATORY COMMENT REPORT: NORMAL
LYMPHOCYTES # BLD AUTO: 2.3 X10*3/UL (ref 0.8–3)
LYMPHOCYTES NFR BLD AUTO: 29.4 %
MAGNESIUM SERPL-MCNC: 1.36 MG/DL (ref 1.6–2.4)
MCH RBC QN AUTO: 28.5 PG (ref 26–34)
MCHC RBC AUTO-ENTMCNC: 31.5 G/DL (ref 32–36)
MCV RBC AUTO: 90 FL (ref 80–100)
MONOCYTES # BLD AUTO: 0.58 X10*3/UL (ref 0.05–0.8)
MONOCYTES NFR BLD AUTO: 7.4 %
NEUTROPHILS # BLD AUTO: 4.58 X10*3/UL (ref 1.6–5.5)
NEUTROPHILS NFR BLD AUTO: 58.6 %
NRBC BLD-RTO: 0 /100 WBCS (ref 0–0)
PATH REPORT.FINAL DX SPEC: NORMAL
PATH REPORT.GROSS SPEC: NORMAL
PATH REPORT.TOTAL CANCER: NORMAL
PLATELET # BLD AUTO: 337 X10*3/UL (ref 150–450)
POTASSIUM SERPL-SCNC: 4.8 MMOL/L (ref 3.5–5.3)
PROT SERPL-MCNC: 6.6 G/DL (ref 6.4–8.2)
PROTHROMBIN TIME: 12.5 SECONDS (ref 9.8–12.8)
RBC # BLD AUTO: 2.88 X10*6/UL (ref 4–5.2)
SODIUM SERPL-SCNC: 136 MMOL/L (ref 136–145)
WBC # BLD AUTO: 7.8 X10*3/UL (ref 4.4–11.3)

## 2024-06-27 PROCEDURE — 2500000004 HC RX 250 GENERAL PHARMACY W/ HCPCS (ALT 636 FOR OP/ED): Performed by: STUDENT IN AN ORGANIZED HEALTH CARE EDUCATION/TRAINING PROGRAM

## 2024-06-27 PROCEDURE — 71045 X-RAY EXAM CHEST 1 VIEW: CPT

## 2024-06-27 PROCEDURE — 70450 CT HEAD/BRAIN W/O DYE: CPT

## 2024-06-27 PROCEDURE — 84484 ASSAY OF TROPONIN QUANT: CPT | Performed by: STUDENT IN AN ORGANIZED HEALTH CARE EDUCATION/TRAINING PROGRAM

## 2024-06-27 PROCEDURE — 70450 CT HEAD/BRAIN W/O DYE: CPT | Performed by: RADIOLOGY

## 2024-06-27 PROCEDURE — 96366 THER/PROPH/DIAG IV INF ADDON: CPT

## 2024-06-27 PROCEDURE — 99285 EMERGENCY DEPT VISIT HI MDM: CPT

## 2024-06-27 PROCEDURE — 96365 THER/PROPH/DIAG IV INF INIT: CPT

## 2024-06-27 PROCEDURE — 71045 X-RAY EXAM CHEST 1 VIEW: CPT | Performed by: RADIOLOGY

## 2024-06-27 PROCEDURE — 83735 ASSAY OF MAGNESIUM: CPT | Performed by: STUDENT IN AN ORGANIZED HEALTH CARE EDUCATION/TRAINING PROGRAM

## 2024-06-27 PROCEDURE — 86901 BLOOD TYPING SEROLOGIC RH(D): CPT | Performed by: STUDENT IN AN ORGANIZED HEALTH CARE EDUCATION/TRAINING PROGRAM

## 2024-06-27 PROCEDURE — 93005 ELECTROCARDIOGRAM TRACING: CPT

## 2024-06-27 PROCEDURE — 85025 COMPLETE CBC W/AUTO DIFF WBC: CPT | Performed by: STUDENT IN AN ORGANIZED HEALTH CARE EDUCATION/TRAINING PROGRAM

## 2024-06-27 PROCEDURE — 80053 COMPREHEN METABOLIC PANEL: CPT | Performed by: STUDENT IN AN ORGANIZED HEALTH CARE EDUCATION/TRAINING PROGRAM

## 2024-06-27 PROCEDURE — 36415 COLL VENOUS BLD VENIPUNCTURE: CPT | Performed by: STUDENT IN AN ORGANIZED HEALTH CARE EDUCATION/TRAINING PROGRAM

## 2024-06-27 PROCEDURE — 85610 PROTHROMBIN TIME: CPT | Performed by: STUDENT IN AN ORGANIZED HEALTH CARE EDUCATION/TRAINING PROGRAM

## 2024-06-27 RX ORDER — MAGNESIUM SULFATE HEPTAHYDRATE 40 MG/ML
2 INJECTION, SOLUTION INTRAVENOUS ONCE
Status: COMPLETED | OUTPATIENT
Start: 2024-06-27 | End: 2024-06-28

## 2024-06-27 ASSESSMENT — PAIN - FUNCTIONAL ASSESSMENT: PAIN_FUNCTIONAL_ASSESSMENT: 0-10

## 2024-06-28 PROBLEM — N39.0 UTI (URINARY TRACT INFECTION): Status: ACTIVE | Noted: 2024-06-28

## 2024-06-28 LAB
ABO GROUP (TYPE) IN BLOOD: NORMAL
ANION GAP SERPL CALC-SCNC: 13 MMOL/L (ref 10–20)
ANTIBODY SCREEN: NORMAL
APPEARANCE UR: CLEAR
BACTERIA #/AREA URNS AUTO: ABNORMAL /HPF
BILIRUB UR STRIP.AUTO-MCNC: NEGATIVE MG/DL
BUN SERPL-MCNC: 27 MG/DL (ref 6–23)
CALCIUM SERPL-MCNC: 8.5 MG/DL (ref 8.6–10.3)
CHLORIDE SERPL-SCNC: 105 MMOL/L (ref 98–107)
CO2 SERPL-SCNC: 23 MMOL/L (ref 21–32)
COLOR UR: YELLOW
CREAT SERPL-MCNC: 1.44 MG/DL (ref 0.5–1.05)
EGFRCR SERPLBLD CKD-EPI 2021: 36 ML/MIN/1.73M*2
ERYTHROCYTE [DISTWIDTH] IN BLOOD BY AUTOMATED COUNT: 16 % (ref 11.5–14.5)
GLUCOSE BLD MANUAL STRIP-MCNC: 101 MG/DL (ref 74–99)
GLUCOSE BLD MANUAL STRIP-MCNC: 110 MG/DL (ref 74–99)
GLUCOSE BLD MANUAL STRIP-MCNC: 113 MG/DL (ref 74–99)
GLUCOSE BLD MANUAL STRIP-MCNC: 123 MG/DL (ref 74–99)
GLUCOSE SERPL-MCNC: 135 MG/DL (ref 74–99)
GLUCOSE UR STRIP.AUTO-MCNC: NORMAL MG/DL
HCT VFR BLD AUTO: 25.2 % (ref 36–46)
HGB BLD-MCNC: 7.7 G/DL (ref 12–16)
HOLD SPECIMEN: NORMAL
KETONES UR STRIP.AUTO-MCNC: NEGATIVE MG/DL
LEUKOCYTE ESTERASE UR QL STRIP.AUTO: ABNORMAL
MAGNESIUM SERPL-MCNC: 1.83 MG/DL (ref 1.6–2.4)
MCH RBC QN AUTO: 27.9 PG (ref 26–34)
MCHC RBC AUTO-ENTMCNC: 30.6 G/DL (ref 32–36)
MCV RBC AUTO: 91 FL (ref 80–100)
NITRITE UR QL STRIP.AUTO: ABNORMAL
NRBC BLD-RTO: 0 /100 WBCS (ref 0–0)
PH UR STRIP.AUTO: 5 [PH]
PLATELET # BLD AUTO: 300 X10*3/UL (ref 150–450)
POTASSIUM SERPL-SCNC: 4.3 MMOL/L (ref 3.5–5.3)
PROT UR STRIP.AUTO-MCNC: NEGATIVE MG/DL
RBC # BLD AUTO: 2.76 X10*6/UL (ref 4–5.2)
RBC # UR STRIP.AUTO: NEGATIVE /UL
RBC #/AREA URNS AUTO: ABNORMAL /HPF
RH FACTOR (ANTIGEN D): NORMAL
SODIUM SERPL-SCNC: 137 MMOL/L (ref 136–145)
SP GR UR STRIP.AUTO: 1.01
UROBILINOGEN UR STRIP.AUTO-MCNC: NORMAL MG/DL
WBC # BLD AUTO: 7.1 X10*3/UL (ref 4.4–11.3)
WBC #/AREA URNS AUTO: ABNORMAL /HPF
WBC CLUMPS #/AREA URNS AUTO: ABNORMAL /HPF

## 2024-06-28 PROCEDURE — 83735 ASSAY OF MAGNESIUM: CPT | Performed by: STUDENT IN AN ORGANIZED HEALTH CARE EDUCATION/TRAINING PROGRAM

## 2024-06-28 PROCEDURE — G0378 HOSPITAL OBSERVATION PER HR: HCPCS

## 2024-06-28 PROCEDURE — 82947 ASSAY GLUCOSE BLOOD QUANT: CPT | Mod: 91

## 2024-06-28 PROCEDURE — 96372 THER/PROPH/DIAG INJ SC/IM: CPT | Performed by: STUDENT IN AN ORGANIZED HEALTH CARE EDUCATION/TRAINING PROGRAM

## 2024-06-28 PROCEDURE — 2500000004 HC RX 250 GENERAL PHARMACY W/ HCPCS (ALT 636 FOR OP/ED): Performed by: STUDENT IN AN ORGANIZED HEALTH CARE EDUCATION/TRAINING PROGRAM

## 2024-06-28 PROCEDURE — 82947 ASSAY GLUCOSE BLOOD QUANT: CPT

## 2024-06-28 PROCEDURE — 96367 TX/PROPH/DG ADDL SEQ IV INF: CPT

## 2024-06-28 PROCEDURE — 85027 COMPLETE CBC AUTOMATED: CPT | Performed by: STUDENT IN AN ORGANIZED HEALTH CARE EDUCATION/TRAINING PROGRAM

## 2024-06-28 PROCEDURE — 2500000001 HC RX 250 WO HCPCS SELF ADMINISTERED DRUGS (ALT 637 FOR MEDICARE OP): Performed by: INTERNAL MEDICINE

## 2024-06-28 PROCEDURE — 87086 URINE CULTURE/COLONY COUNT: CPT | Mod: PARLAB | Performed by: STUDENT IN AN ORGANIZED HEALTH CARE EDUCATION/TRAINING PROGRAM

## 2024-06-28 PROCEDURE — 36415 COLL VENOUS BLD VENIPUNCTURE: CPT | Performed by: STUDENT IN AN ORGANIZED HEALTH CARE EDUCATION/TRAINING PROGRAM

## 2024-06-28 PROCEDURE — 2500000002 HC RX 250 W HCPCS SELF ADMINISTERED DRUGS (ALT 637 FOR MEDICARE OP, ALT 636 FOR OP/ED): Performed by: STUDENT IN AN ORGANIZED HEALTH CARE EDUCATION/TRAINING PROGRAM

## 2024-06-28 PROCEDURE — 2500000002 HC RX 250 W HCPCS SELF ADMINISTERED DRUGS (ALT 637 FOR MEDICARE OP, ALT 636 FOR OP/ED): Performed by: INTERNAL MEDICINE

## 2024-06-28 PROCEDURE — 2500000001 HC RX 250 WO HCPCS SELF ADMINISTERED DRUGS (ALT 637 FOR MEDICARE OP): Performed by: STUDENT IN AN ORGANIZED HEALTH CARE EDUCATION/TRAINING PROGRAM

## 2024-06-28 PROCEDURE — 99222 1ST HOSP IP/OBS MODERATE 55: CPT | Performed by: STUDENT IN AN ORGANIZED HEALTH CARE EDUCATION/TRAINING PROGRAM

## 2024-06-28 PROCEDURE — 2500000005 HC RX 250 GENERAL PHARMACY W/O HCPCS: Performed by: INTERNAL MEDICINE

## 2024-06-28 PROCEDURE — 80048 BASIC METABOLIC PNL TOTAL CA: CPT | Performed by: STUDENT IN AN ORGANIZED HEALTH CARE EDUCATION/TRAINING PROGRAM

## 2024-06-28 PROCEDURE — 81001 URINALYSIS AUTO W/SCOPE: CPT | Performed by: STUDENT IN AN ORGANIZED HEALTH CARE EDUCATION/TRAINING PROGRAM

## 2024-06-28 RX ORDER — MIRTAZAPINE 15 MG/1
15 TABLET, FILM COATED ORAL NIGHTLY
Status: DISCONTINUED | OUTPATIENT
Start: 2024-06-28 | End: 2024-06-30 | Stop reason: HOSPADM

## 2024-06-28 RX ORDER — ONDANSETRON HYDROCHLORIDE 2 MG/ML
4 INJECTION, SOLUTION INTRAVENOUS EVERY 8 HOURS PRN
Status: DISCONTINUED | OUTPATIENT
Start: 2024-06-28 | End: 2024-06-30 | Stop reason: HOSPADM

## 2024-06-28 RX ORDER — SERTRALINE HYDROCHLORIDE 50 MG/1
50 TABLET, FILM COATED ORAL DAILY
Status: DISCONTINUED | OUTPATIENT
Start: 2024-06-28 | End: 2024-06-30 | Stop reason: HOSPADM

## 2024-06-28 RX ORDER — ACETAMINOPHEN 325 MG/1
650 TABLET ORAL EVERY 6 HOURS
Status: DISCONTINUED | OUTPATIENT
Start: 2024-06-28 | End: 2024-06-30 | Stop reason: HOSPADM

## 2024-06-28 RX ORDER — FERROUS SULFATE 325(65) MG
65 TABLET ORAL DAILY
Status: DISCONTINUED | OUTPATIENT
Start: 2024-06-28 | End: 2024-06-30 | Stop reason: HOSPADM

## 2024-06-28 RX ORDER — LIDOCAINE 560 MG/1
2 PATCH PERCUTANEOUS; TOPICAL; TRANSDERMAL DAILY
Status: DISCONTINUED | OUTPATIENT
Start: 2024-06-28 | End: 2024-06-30 | Stop reason: HOSPADM

## 2024-06-28 RX ORDER — ACETAMINOPHEN 650 MG/1
650 SUPPOSITORY RECTAL EVERY 4 HOURS PRN
Status: DISCONTINUED | OUTPATIENT
Start: 2024-06-28 | End: 2024-06-28

## 2024-06-28 RX ORDER — METOPROLOL SUCCINATE 50 MG/1
100 TABLET, EXTENDED RELEASE ORAL DAILY
Status: DISCONTINUED | OUTPATIENT
Start: 2024-06-28 | End: 2024-06-30 | Stop reason: HOSPADM

## 2024-06-28 RX ORDER — ACETAMINOPHEN 325 MG/1
650 TABLET ORAL EVERY 4 HOURS PRN
Status: DISCONTINUED | OUTPATIENT
Start: 2024-06-28 | End: 2024-06-30 | Stop reason: HOSPADM

## 2024-06-28 RX ORDER — ROSUVASTATIN CALCIUM 10 MG/1
40 TABLET, COATED ORAL DAILY
Status: DISCONTINUED | OUTPATIENT
Start: 2024-06-28 | End: 2024-06-30 | Stop reason: HOSPADM

## 2024-06-28 RX ORDER — HYDROCODONE BITARTRATE AND ACETAMINOPHEN 5; 325 MG/1; MG/1
1 TABLET ORAL EVERY 8 HOURS PRN
Status: DISCONTINUED | OUTPATIENT
Start: 2024-06-28 | End: 2024-06-30 | Stop reason: HOSPADM

## 2024-06-28 RX ORDER — AMLODIPINE BESYLATE 10 MG/1
10 TABLET ORAL DAILY
Status: DISCONTINUED | OUTPATIENT
Start: 2024-06-28 | End: 2024-06-30 | Stop reason: HOSPADM

## 2024-06-28 RX ORDER — HYDROCHLOROTHIAZIDE 25 MG/1
25 TABLET ORAL
Status: DISCONTINUED | OUTPATIENT
Start: 2024-06-28 | End: 2024-06-30 | Stop reason: HOSPADM

## 2024-06-28 RX ORDER — ACETAMINOPHEN 160 MG/5ML
650 SOLUTION ORAL EVERY 4 HOURS PRN
Status: DISCONTINUED | OUTPATIENT
Start: 2024-06-28 | End: 2024-06-30 | Stop reason: HOSPADM

## 2024-06-28 RX ORDER — ACETAMINOPHEN 325 MG/1
650 TABLET ORAL EVERY 4 HOURS PRN
Status: DISCONTINUED | OUTPATIENT
Start: 2024-06-28 | End: 2024-06-28

## 2024-06-28 RX ORDER — CEFTRIAXONE 1 G/50ML
1 INJECTION, SOLUTION INTRAVENOUS EVERY 24 HOURS
Status: DISCONTINUED | OUTPATIENT
Start: 2024-06-29 | End: 2024-06-30 | Stop reason: HOSPADM

## 2024-06-28 RX ORDER — MECLIZINE HYDROCHLORIDE 25 MG/1
25 TABLET ORAL 3 TIMES DAILY PRN
Status: DISCONTINUED | OUTPATIENT
Start: 2024-06-28 | End: 2024-06-30 | Stop reason: HOSPADM

## 2024-06-28 RX ORDER — ACETAMINOPHEN 650 MG/1
650 SUPPOSITORY RECTAL EVERY 4 HOURS PRN
Status: DISCONTINUED | OUTPATIENT
Start: 2024-06-28 | End: 2024-06-29

## 2024-06-28 RX ORDER — ONDANSETRON 4 MG/1
4 TABLET, FILM COATED ORAL EVERY 8 HOURS PRN
Status: DISCONTINUED | OUTPATIENT
Start: 2024-06-28 | End: 2024-06-29

## 2024-06-28 RX ORDER — TALC
6 POWDER (GRAM) TOPICAL NIGHTLY
Status: DISCONTINUED | OUTPATIENT
Start: 2024-06-28 | End: 2024-06-30 | Stop reason: HOSPADM

## 2024-06-28 RX ORDER — MULTIVIT-MIN/IRON FUM/FOLIC AC 7.5 MG-4
1 TABLET ORAL DAILY
Status: DISCONTINUED | OUTPATIENT
Start: 2024-06-28 | End: 2024-06-30 | Stop reason: HOSPADM

## 2024-06-28 RX ORDER — TALC
3 POWDER (GRAM) TOPICAL NIGHTLY PRN
Status: DISCONTINUED | OUTPATIENT
Start: 2024-06-28 | End: 2024-06-30 | Stop reason: HOSPADM

## 2024-06-28 RX ORDER — CEFTRIAXONE 1 G/50ML
1 INJECTION, SOLUTION INTRAVENOUS ONCE
Status: COMPLETED | OUTPATIENT
Start: 2024-06-28 | End: 2024-06-28

## 2024-06-28 RX ORDER — ASPIRIN 81 MG/1
81 TABLET ORAL DAILY
Status: DISCONTINUED | OUTPATIENT
Start: 2024-06-28 | End: 2024-06-30 | Stop reason: HOSPADM

## 2024-06-28 RX ORDER — ENOXAPARIN SODIUM 100 MG/ML
40 INJECTION SUBCUTANEOUS DAILY
Status: DISCONTINUED | OUTPATIENT
Start: 2024-06-30 | End: 2024-06-30 | Stop reason: HOSPADM

## 2024-06-28 RX ORDER — PANTOPRAZOLE SODIUM 40 MG/1
40 TABLET, DELAYED RELEASE ORAL
Status: DISCONTINUED | OUTPATIENT
Start: 2024-06-28 | End: 2024-06-30 | Stop reason: HOSPADM

## 2024-06-28 RX ORDER — QUETIAPINE FUMARATE 25 MG/1
12.5 TABLET, FILM COATED ORAL 2 TIMES DAILY PRN
Status: DISCONTINUED | OUTPATIENT
Start: 2024-06-28 | End: 2024-06-30 | Stop reason: HOSPADM

## 2024-06-28 RX ORDER — ENALAPRIL MALEATE 10 MG/1
10 TABLET ORAL DAILY
Status: DISCONTINUED | OUTPATIENT
Start: 2024-06-28 | End: 2024-06-30 | Stop reason: HOSPADM

## 2024-06-28 RX ORDER — ACETAMINOPHEN 160 MG/5ML
650 SOLUTION ORAL EVERY 4 HOURS PRN
Status: DISCONTINUED | OUTPATIENT
Start: 2024-06-28 | End: 2024-06-28

## 2024-06-28 RX ORDER — ENOXAPARIN SODIUM 100 MG/ML
40 INJECTION SUBCUTANEOUS EVERY 12 HOURS SCHEDULED
Status: DISCONTINUED | OUTPATIENT
Start: 2024-06-28 | End: 2024-06-28

## 2024-06-28 SDOH — ECONOMIC STABILITY: TRANSPORTATION INSECURITY
IN THE PAST 12 MONTHS, HAS THE LACK OF TRANSPORTATION KEPT YOU FROM MEDICAL APPOINTMENTS OR FROM GETTING MEDICATIONS?: PATIENT DECLINED

## 2024-06-28 SDOH — ECONOMIC STABILITY: TRANSPORTATION INSECURITY
IN THE PAST 12 MONTHS, HAS LACK OF TRANSPORTATION KEPT YOU FROM MEETINGS, WORK, OR FROM GETTING THINGS NEEDED FOR DAILY LIVING?: PATIENT UNABLE TO ANSWER

## 2024-06-28 SDOH — ECONOMIC STABILITY: INCOME INSECURITY: IN THE LAST 12 MONTHS, WAS THERE A TIME WHEN YOU WERE NOT ABLE TO PAY THE MORTGAGE OR RENT ON TIME?: PATIENT DECLINED

## 2024-06-28 SDOH — ECONOMIC STABILITY: INCOME INSECURITY
IN THE LAST 12 MONTHS, WAS THERE A TIME WHEN YOU WERE NOT ABLE TO PAY THE MORTGAGE OR RENT ON TIME?: PATIENT UNABLE TO ANSWER

## 2024-06-28 SDOH — SOCIAL STABILITY: SOCIAL INSECURITY: WERE YOU ABLE TO COMPLETE ALL THE BEHAVIORAL HEALTH SCREENINGS?: YES

## 2024-06-28 SDOH — ECONOMIC STABILITY: TRANSPORTATION INSECURITY
IN THE PAST 12 MONTHS, HAS LACK OF TRANSPORTATION KEPT YOU FROM MEETINGS, WORK, OR FROM GETTING THINGS NEEDED FOR DAILY LIVING?: PATIENT DECLINED

## 2024-06-28 SDOH — SOCIAL STABILITY: SOCIAL INSECURITY: ABUSE: ADULT

## 2024-06-28 SDOH — ECONOMIC STABILITY: TRANSPORTATION INSECURITY
IN THE PAST 12 MONTHS, HAS THE LACK OF TRANSPORTATION KEPT YOU FROM MEDICAL APPOINTMENTS OR FROM GETTING MEDICATIONS?: PATIENT UNABLE TO ANSWER

## 2024-06-28 SDOH — ECONOMIC STABILITY: INCOME INSECURITY
HOW HARD IS IT FOR YOU TO PAY FOR THE VERY BASICS LIKE FOOD, HOUSING, MEDICAL CARE, AND HEATING?: PATIENT UNABLE TO ANSWER

## 2024-06-28 SDOH — ECONOMIC STABILITY: HOUSING INSECURITY
IN THE LAST 12 MONTHS, WAS THERE A TIME WHEN YOU DID NOT HAVE A STEADY PLACE TO SLEEP OR SLEPT IN A SHELTER (INCLUDING NOW)?: PATIENT UNABLE TO ANSWER

## 2024-06-28 SDOH — ECONOMIC STABILITY: HOUSING INSECURITY
IN THE LAST 12 MONTHS, WAS THERE A TIME WHEN YOU DID NOT HAVE A STEADY PLACE TO SLEEP OR SLEPT IN A SHELTER (INCLUDING NOW)?: PATIENT DECLINED

## 2024-06-28 SDOH — SOCIAL STABILITY: SOCIAL INSECURITY: HAVE YOU HAD THOUGHTS OF HARMING ANYONE ELSE?: NO

## 2024-06-28 SDOH — SOCIAL STABILITY: SOCIAL INSECURITY: ARE YOU OR HAVE YOU BEEN THREATENED OR ABUSED PHYSICALLY, EMOTIONALLY, OR SEXUALLY BY ANYONE?: NO

## 2024-06-28 SDOH — SOCIAL STABILITY: SOCIAL INSECURITY: DOES ANYONE TRY TO KEEP YOU FROM HAVING/CONTACTING OTHER FRIENDS OR DOING THINGS OUTSIDE YOUR HOME?: NO

## 2024-06-28 SDOH — SOCIAL STABILITY: SOCIAL INSECURITY: HAVE YOU HAD ANY THOUGHTS OF HARMING ANYONE ELSE?: NO

## 2024-06-28 SDOH — ECONOMIC STABILITY: HOUSING INSECURITY: IN THE LAST 12 MONTHS, HOW MANY PLACES HAVE YOU LIVED?: 1

## 2024-06-28 SDOH — SOCIAL STABILITY: SOCIAL INSECURITY: HAS ANYONE EVER THREATENED TO HURT YOUR FAMILY OR YOUR PETS?: NO

## 2024-06-28 SDOH — SOCIAL STABILITY: SOCIAL INSECURITY: ARE THERE ANY APPARENT SIGNS OF INJURIES/BEHAVIORS THAT COULD BE RELATED TO ABUSE/NEGLECT?: NO

## 2024-06-28 SDOH — ECONOMIC STABILITY: INCOME INSECURITY: HOW HARD IS IT FOR YOU TO PAY FOR THE VERY BASICS LIKE FOOD, HOUSING, MEDICAL CARE, AND HEATING?: PATIENT DECLINED

## 2024-06-28 SDOH — SOCIAL STABILITY: SOCIAL INSECURITY: DO YOU FEEL ANYONE HAS EXPLOITED OR TAKEN ADVANTAGE OF YOU FINANCIALLY OR OF YOUR PERSONAL PROPERTY?: NO

## 2024-06-28 SDOH — SOCIAL STABILITY: SOCIAL INSECURITY: DO YOU FEEL UNSAFE GOING BACK TO THE PLACE WHERE YOU ARE LIVING?: NO

## 2024-06-28 ASSESSMENT — COGNITIVE AND FUNCTIONAL STATUS - GENERAL
MOBILITY SCORE: 6
DRESSING REGULAR UPPER BODY CLOTHING: TOTAL
DRESSING REGULAR UPPER BODY CLOTHING: TOTAL
DRESSING REGULAR LOWER BODY CLOTHING: TOTAL
PATIENT BASELINE BEDBOUND: NO
EATING MEALS: A LITTLE
MOBILITY SCORE: 6
WALKING IN HOSPITAL ROOM: TOTAL
MOVING TO AND FROM BED TO CHAIR: TOTAL
PERSONAL GROOMING: A LITTLE
MOBILITY SCORE: 6
MOVING FROM LYING ON BACK TO SITTING ON SIDE OF FLAT BED WITH BEDRAILS: TOTAL
HELP NEEDED FOR BATHING: TOTAL
EATING MEALS: A LITTLE
DRESSING REGULAR LOWER BODY CLOTHING: TOTAL
WALKING IN HOSPITAL ROOM: TOTAL
MOVING FROM LYING ON BACK TO SITTING ON SIDE OF FLAT BED WITH BEDRAILS: TOTAL
CLIMB 3 TO 5 STEPS WITH RAILING: TOTAL
EATING MEALS: A LITTLE
TOILETING: TOTAL
CLIMB 3 TO 5 STEPS WITH RAILING: TOTAL
STANDING UP FROM CHAIR USING ARMS: TOTAL
TURNING FROM BACK TO SIDE WHILE IN FLAT BAD: TOTAL
DAILY ACTIVITIY SCORE: 10
TOILETING: TOTAL
PERSONAL GROOMING: A LITTLE
TURNING FROM BACK TO SIDE WHILE IN FLAT BAD: TOTAL
DAILY ACTIVITIY SCORE: 10
PERSONAL GROOMING: A LITTLE
DRESSING REGULAR UPPER BODY CLOTHING: TOTAL
DAILY ACTIVITIY SCORE: 10
DRESSING REGULAR LOWER BODY CLOTHING: TOTAL
TOILETING: TOTAL
WALKING IN HOSPITAL ROOM: TOTAL
HELP NEEDED FOR BATHING: TOTAL
STANDING UP FROM CHAIR USING ARMS: TOTAL
TURNING FROM BACK TO SIDE WHILE IN FLAT BAD: TOTAL
MOVING FROM LYING ON BACK TO SITTING ON SIDE OF FLAT BED WITH BEDRAILS: TOTAL
MOVING TO AND FROM BED TO CHAIR: TOTAL
CLIMB 3 TO 5 STEPS WITH RAILING: TOTAL
HELP NEEDED FOR BATHING: TOTAL
STANDING UP FROM CHAIR USING ARMS: TOTAL
MOVING TO AND FROM BED TO CHAIR: TOTAL

## 2024-06-28 ASSESSMENT — ACTIVITIES OF DAILY LIVING (ADL)
DRESSING YOURSELF: NEEDS ASSISTANCE
FEEDING YOURSELF: INDEPENDENT
HEARING - LEFT EAR: FUNCTIONAL
WALKS IN HOME: NEEDS ASSISTANCE
TOILETING: DEPENDENT
ADEQUATE_TO_COMPLETE_ADL: YES
BATHING: DEPENDENT
PATIENT'S MEMORY ADEQUATE TO SAFELY COMPLETE DAILY ACTIVITIES?: NO
JUDGMENT_ADEQUATE_SAFELY_COMPLETE_DAILY_ACTIVITIES: UNABLE TO ASSESS
HEARING - RIGHT EAR: FUNCTIONAL
GROOMING: NEEDS ASSISTANCE
ASSISTIVE_DEVICE: WALKER

## 2024-06-28 ASSESSMENT — LIFESTYLE VARIABLES
EVER HAD A DRINK FIRST THING IN THE MORNING TO STEADY YOUR NERVES TO GET RID OF A HANGOVER: NO
AUDIT-C TOTAL SCORE: 0
HOW OFTEN DO YOU HAVE 6 OR MORE DRINKS ON ONE OCCASION: NEVER
HAVE PEOPLE ANNOYED YOU BY CRITICIZING YOUR DRINKING: NO
SKIP TO QUESTIONS 9-10: 1
HOW MANY STANDARD DRINKS CONTAINING ALCOHOL DO YOU HAVE ON A TYPICAL DAY: PATIENT DOES NOT DRINK
HAVE YOU EVER FELT YOU SHOULD CUT DOWN ON YOUR DRINKING: NO
EVER FELT BAD OR GUILTY ABOUT YOUR DRINKING: NO
HOW OFTEN DO YOU HAVE A DRINK CONTAINING ALCOHOL: NEVER
TOTAL SCORE: 0
AUDIT-C TOTAL SCORE: 0

## 2024-06-28 ASSESSMENT — ENCOUNTER SYMPTOMS
FATIGUE: 1
FEVER: 1
DYSURIA: 1

## 2024-06-28 ASSESSMENT — PATIENT HEALTH QUESTIONNAIRE - PHQ9
SUM OF ALL RESPONSES TO PHQ9 QUESTIONS 1 & 2: 0
1. LITTLE INTEREST OR PLEASURE IN DOING THINGS: NOT AT ALL
2. FEELING DOWN, DEPRESSED OR HOPELESS: NOT AT ALL

## 2024-06-28 ASSESSMENT — PAIN SCALES - GENERAL
PAINLEVEL_OUTOF10: 0 - NO PAIN

## 2024-06-28 NOTE — H&P
History Of Present Illness  Vivienne Li is a 83 y.o. female presenting with altered mental status. Patient w/dementia unable to provide complete history. Some history obtained from chart review. Patient was sent in by family d/t concern for confusion, weakness, and AMS. Patient speaks Azerbaijani; Dr. Lakhani at bedside interpreting. Patient reports some dysuria and suprapubic discomfort as well as subjective fever. She is aware that she is in the hospital; but does not provide much more information. Of note, patient was recently discharged after being treated for upper GI bleed. No further bleeding reported. In the ED, patient afebrile and HDS. Labs show hypomagnesemia, chronic anemia, and +UA. Patient started on rocephin.      Past Medical History  T2DM, HTN, HLD, osteoarthritis, dementia, PUD w/recent GI bleed, ELIZABETH, CKD    Surgical History  She has a past surgical history that includes No past surgeries.     Social History  She reports that she has never smoked. She has never used smokeless tobacco. She reports that she does not drink alcohol. No history on file for drug use.    Family History  Family History   Problem Relation Name Age of Onset    No Known Problems Mother      No Known Problems Father          Allergies  Patient has no known allergies.    Review of Systems   Constitutional:  Positive for fatigue and fever.   Genitourinary:  Positive for dysuria.   All other systems reviewed and are negative.       Physical Exam  General Appearance: awake and alert, AAOx2 to name and place, NAD  HEENT: nc/at, eomi, perrla, dry oral mucosa  Resp: ctab; no wheezing, rhonchi, or rales, normal respiratory effort  Cardio: rrr. S1s2  GI: soft, obese ntnd, BS+, mild suprapubic tenderness  Ext: trace edema, 2+ pulses b/l       Last Recorded Vitals  /65   Pulse 78   Temp 35.6 °C (96.1 °F) (Temporal)   Resp 18   Wt 104 kg (230 lb)   SpO2 98%     Relevant Results  Results for orders placed or performed during  the hospital encounter of 06/27/24 (from the past 24 hour(s))   CBC and Auto Differential   Result Value Ref Range    WBC 7.8 4.4 - 11.3 x10*3/uL    nRBC 0.0 0.0 - 0.0 /100 WBCs    RBC 2.88 (L) 4.00 - 5.20 x10*6/uL    Hemoglobin 8.2 (L) 12.0 - 16.0 g/dL    Hematocrit 26.0 (L) 36.0 - 46.0 %    MCV 90 80 - 100 fL    MCH 28.5 26.0 - 34.0 pg    MCHC 31.5 (L) 32.0 - 36.0 g/dL    RDW 15.9 (H) 11.5 - 14.5 %    Platelets 337 150 - 450 x10*3/uL    Neutrophils % 58.6 40.0 - 80.0 %    Immature Granulocytes %, Automated 0.5 0.0 - 0.9 %    Lymphocytes % 29.4 13.0 - 44.0 %    Monocytes % 7.4 2.0 - 10.0 %    Eosinophils % 3.5 0.0 - 6.0 %    Basophils % 0.6 0.0 - 2.0 %    Neutrophils Absolute 4.58 1.60 - 5.50 x10*3/uL    Immature Granulocytes Absolute, Automated 0.04 0.00 - 0.50 x10*3/uL    Lymphocytes Absolute 2.30 0.80 - 3.00 x10*3/uL    Monocytes Absolute 0.58 0.05 - 0.80 x10*3/uL    Eosinophils Absolute 0.27 0.00 - 0.40 x10*3/uL    Basophils Absolute 0.05 0.00 - 0.10 x10*3/uL   Comprehensive Metabolic Panel   Result Value Ref Range    Glucose 132 (H) 74 - 99 mg/dL    Sodium 136 136 - 145 mmol/L    Potassium 4.8 3.5 - 5.3 mmol/L    Chloride 105 98 - 107 mmol/L    Bicarbonate 23 21 - 32 mmol/L    Anion Gap 13 10 - 20 mmol/L    Urea Nitrogen 27 (H) 6 - 23 mg/dL    Creatinine 1.50 (H) 0.50 - 1.05 mg/dL    eGFR 34 (L) >60 mL/min/1.73m*2    Calcium 8.9 8.6 - 10.3 mg/dL    Albumin 3.6 3.4 - 5.0 g/dL    Alkaline Phosphatase 73 33 - 136 U/L    Total Protein 6.6 6.4 - 8.2 g/dL    AST 17 9 - 39 U/L    Bilirubin, Total 0.3 0.0 - 1.2 mg/dL    ALT 11 7 - 45 U/L   Magnesium   Result Value Ref Range    Magnesium 1.36 (L) 1.60 - 2.40 mg/dL   Troponin I, High Sensitivity   Result Value Ref Range    Troponin I, High Sensitivity 11 0 - 13 ng/L   Coagulation Screen   Result Value Ref Range    Protime 12.5 9.8 - 12.8 seconds    INR 1.1 0.9 - 1.1    aPTT 30 27 - 38 seconds   Type and Screen   Result Value Ref Range    ABO TYPE O     Rh TYPE POS      ANTIBODY SCREEN NEG    Urinalysis with Reflex Culture and Microscopic   Result Value Ref Range    Color, Urine Yellow Light-Yellow, Yellow, Dark-Yellow    Appearance, Urine Clear Clear    Specific Gravity, Urine 1.015 1.005 - 1.035    pH, Urine 5.0 5.0, 5.5, 6.0, 6.5, 7.0, 7.5, 8.0    Protein, Urine NEGATIVE NEGATIVE, 10 (TRACE), 20 (TRACE) mg/dL    Glucose, Urine Normal Normal mg/dL    Blood, Urine NEGATIVE NEGATIVE    Ketones, Urine NEGATIVE NEGATIVE mg/dL    Bilirubin, Urine NEGATIVE NEGATIVE    Urobilinogen, Urine Normal Normal mg/dL    Nitrite, Urine 2+ (A) NEGATIVE    Leukocyte Esterase, Urine 75 Matt/µL (A) NEGATIVE   Microscopic Only, Urine   Result Value Ref Range    WBC, Urine 6-10 (A) 1-5, NONE /HPF    WBC Clumps, Urine RARE Reference range not established. /HPF    RBC, Urine 1-2 NONE, 1-2, 3-5 /HPF    Bacteria, Urine 4+ (A) NONE SEEN /HPF   CBC   Result Value Ref Range    WBC 7.1 4.4 - 11.3 x10*3/uL    nRBC 0.0 0.0 - 0.0 /100 WBCs    RBC 2.76 (L) 4.00 - 5.20 x10*6/uL    Hemoglobin 7.7 (L) 12.0 - 16.0 g/dL    Hematocrit 25.2 (L) 36.0 - 46.0 %    MCV 91 80 - 100 fL    MCH 27.9 26.0 - 34.0 pg    MCHC 30.6 (L) 32.0 - 36.0 g/dL    RDW 16.0 (H) 11.5 - 14.5 %    Platelets 300 150 - 450 x10*3/uL   Basic metabolic panel   Result Value Ref Range    Glucose 135 (H) 74 - 99 mg/dL    Sodium 137 136 - 145 mmol/L    Potassium 4.3 3.5 - 5.3 mmol/L    Chloride 105 98 - 107 mmol/L    Bicarbonate 23 21 - 32 mmol/L    Anion Gap 13 10 - 20 mmol/L    Urea Nitrogen 27 (H) 6 - 23 mg/dL    Creatinine 1.44 (H) 0.50 - 1.05 mg/dL    eGFR 36 (L) >60 mL/min/1.73m*2    Calcium 8.5 (L) 8.6 - 10.3 mg/dL   Magnesium   Result Value Ref Range    Magnesium 1.83 1.60 - 2.40 mg/dL     CT head wo IV contrast    Result Date: 6/28/2024  Interpreted By:  Kaitlyn Leiva, STUDY: CT HEAD WO IV CONTRAST;  6/27/2024 11:52 pm   INDICATION: Signs/Symptoms:ams.   COMPARISON: None.   ACCESSION NUMBER(S): [id number]   ORDERING CLINICIAN: JESSICA WARD    TECHNIQUE: Axial noncontrast CT images of the head.   FINDINGS: BRAIN PARENCHYMA: Mild deep and periventricular white matter hypodensities are nonspecific, but favored to represent chronic small vessel ischemic changes.  Gray-white matter interfaces are preserved. No mass effect or midline shift.   HEMORRHAGE: No acute intracranial hemorrhage. VENTRICLES and EXTRA-AXIAL SPACES: The ventricles and sulci are within normal limits in size for brain volume. No abnormal extraaxial fluid collection. EXTRACRANIAL SOFT TISSUES: Within normal limits. PARANASAL SINUSES/MASTOIDS:  The visualized paranasal sinuses and mastoid air cells are aerated. CALVARIUM: No depressed skull fracture. No destructive osseous lesion.   OTHER FINDINGS: None.       No acute intracranial abnormality.   Mild chronic small vessel ischemic changes.   MACRO: None   Signed by: Kaitlyn Leiva 6/28/2024 1:13 AM Dictation workstation:   [id number]    XR chest 1 view    Result Date: 6/27/2024  Interpreted By:  Valentine Browning, STUDY: XR CHEST 1 VIEW 6/27/2024 10:40 pm   INDICATION: Signs/Symptoms:ams   COMPARISON: None   ACCESSION NUMBER(S): [id number]   ORDERING CLINICIAN: JESSICA WARD   TECHNIQUE: AP view   FINDINGS: There are low lung volumes. There is minimal left basilar atelectasis or infiltrate. Pulmonary vascularity and cardiac silhouette appear normal.       1. Low lung volumes 2. Minimal left basilar atelectasis or infiltrate.     Signed by: Valentine Browning 6/27/2024 11:06 PM Dictation workstation:   [id number]   Scheduled medications  [START ON 6/29/2024] cefTRIAXone, 1 g, intravenous, q24h      Continuous medications     PRN medications  PRN medications: acetaminophen **OR** acetaminophen **OR** acetaminophen, acetaminophen **OR** acetaminophen **OR** acetaminophen, ondansetron **OR** ondansetron               Assessment/Plan   Principal Problem:    UTI (urinary tract infection)      84 y/o F w/PMH of T2DM, HTN, HLD, osteoarthritis,  dementia, PUD w/recent GI bleed, ELIZABETH, CKD presenting w/weakness and confusion; recently admitted w/upper GI bleed, now found w/UTI.    UTI  Hypomagnesemia  Stable chronic normocytic anemia  HTN  DM2  CKD  Dementia  Depression    Continue IV rocephin, ucx pending  H/H stable, no further bleeding reported. Continue home ppi. Avoid nsaids  HDS  Start lantus, ISS  Kidney function stable and appears at baseline  Rest of home meds resumed  DVT ppx w/lovenox  Full Code           Cornelio Hernandez MD

## 2024-06-28 NOTE — ED PROVIDER NOTES
EMERGENCY DEPARTMENT ENCOUNTER      Pt Name: Vivienne Li  MRN: [id number]  Birthdate 1940  Date of evaluation: 6/27/2024  Provider: Aditya Stovall DO    CHIEF COMPLAINT       Chief Complaint   Patient presents with    Altered Mental Status     Pt BIB EMS with  c/o AMS. Pt speaks [place] only. Awaiting arrival of family members.       HISTORY OF PRESENT ILLNESS    Vivienne Li is a 83 y.o. female who presents to the emergency department with EMS for reported altered mental status.  Was found this evening confused still moving all extremities.  Family felt that she was less responsive in her primary language [place].  EMS reports blood glucose within normal range and route.  No additional interventions performed.  Patient appears to be in no distress.  She was recently admitted to the hospital for hematemesis and received blood transfusion at that time.  No reported hematemesis today.    Family now at bedside reporting that patient's cognitive decline occurred throughout the day today.  She has been generally weaker as well.  They have been unable to ambulate her when this occurred they called EMS for further evaluation.  With recent hospital admission she has had no additional episodes of hematemesis at home.  Family denies any respiratory symptoms.          Nursing Notes were reviewed.    REVIEW OF SYSTEMS     Review of systems unobtainable secondary to patient's altered mentation at this time.    PAST MEDICAL HISTORY     Past Medical History:   Diagnosis Date    Essential (primary) hypertension 09/01/2022    Benign essential HTN    Other migraine, not intractable, without status migrainosus 09/01/2022    Migraine variant    Personal history of other mental and behavioral disorders 09/01/2022    History of anxiety       SURGICAL HISTORY       Past Surgical History:   Procedure Laterality Date    NO PAST SURGERIES         ALLERGIES     Patient has no known allergies.    FAMILY HISTORY       Family  History   Problem Relation Name Age of Onset    No Known Problems Mother      No Known Problems Father          SOCIAL HISTORY       Social History     Socioeconomic History    Marital status:      Spouse name: Not on file    Number of children: Not on file    Years of education: Not on file    Highest education level: Not on file   Occupational History    Not on file   Tobacco Use    Smoking status: Never    Smokeless tobacco: Never   Substance and Sexual Activity    Alcohol use: Never    Drug use: Not on file    Sexual activity: Not on file   Other Topics Concern    Not on file   Social History Narrative    Not on file     Social Determinants of Health     Financial Resource Strain: Low Risk  (6/16/2024)    Overall Financial Resource Strain (CARDIA)     Difficulty of Paying Living Expenses: Not hard at all   Food Insecurity: Not on file   Transportation Needs: No Transportation Needs (6/16/2024)    PRAPARE - Transportation     Lack of Transportation (Medical): No     Lack of Transportation (Non-Medical): No   Physical Activity: Not on file   Stress: Not on file   Social Connections: Not on file   Intimate Partner Violence: Not on file   Housing Stability: Low Risk  (6/16/2024)    Housing Stability Vital Sign     Unable to Pay for Housing in the Last Year: No     Number of Places Lived in the Last Year: 1     Unstable Housing in the Last Year: No       PHYSICAL EXAM   VS: As documented in the triage note from today's date and EMR flowsheet were reviewed.  Gen: Obese. No acute distress. Seated in bed. Appears nontoxic.   Skin: Warm. Dry. Intact. No rashes or lesions.  Eyes: Pupils equally round and reactive to light. Clear sclera. EOMI.  HENT: Atraumatic appearance. Mucosal membranes moist. No oral lesions, uvula midline, airway patent.  TMs clear bilaterally nares clear bilaterally no meningismal signs trachea is midline.  CV: Regular rate and regular rhythm. S1, S2. No pedal edema. Warm extremities.  Resp:  Nonlabored breathing Clear to auscultation bilaterally. No increased work of breathing.   GI: Soft and nontender. No rebound or guarding. Bowel sounds x4 present.  Figueroa sign McBurney's point tenderness is negative no CVA tenderness.  MSK: Symmetric muscle bulk. No joint swelling in the extremities. Compartments are soft. Neurovascularly intact x4 extremities. Radial pulses +2 equal bilaterally.  Pedal pulses +2 bilaterally.  Neuro: Alert. CN II - XII intact. Speech fluent. Moving all extremities. No focal deficits.   Psych: Appropriate. Kempt.    DIAGNOSTIC RESULTS   RADIOLOGY:   Non-plain film images such as CT, Ultrasound and MRI are read by the radiologist. Plain radiographic images are visualized and preliminarily interpreted by the emergency physician with the below findings: Chest x-ray no evidence of pneumonia.      Interpretation per the Radiologist below, if available at the time of this note:    CT head wo IV contrast   Final Result   No acute intracranial abnormality.        Mild chronic small vessel ischemic changes.        MACRO:   None        Signed by: Kaitlyn Leiva 6/28/2024 1:13 AM   Dictation workstation:   [id number]      XR chest 1 view   Final Result   1. Low lung volumes   2. Minimal left basilar atelectasis or infiltrate.             Signed by: Valentine Browning 6/27/2024 11:06 PM   Dictation workstation:   [id number]            ED BEDSIDE ULTRASOUND:   Performed by ED Physician - none    LABS:  Labs Reviewed   CBC WITH AUTO DIFFERENTIAL - Abnormal       Result Value    WBC 7.8      nRBC 0.0      RBC 2.88 (*)     Hemoglobin 8.2 (*)     Hematocrit 26.0 (*)     MCV 90      MCH 28.5      MCHC 31.5 (*)     RDW 15.9 (*)     Platelets 337      Neutrophils % 58.6      Immature Granulocytes %, Automated 0.5      Lymphocytes % 29.4      Monocytes % 7.4      Eosinophils % 3.5      Basophils % 0.6      Neutrophils Absolute 4.58      Immature Granulocytes Absolute, Automated 0.04      Lymphocytes Absolute  2.30      Monocytes Absolute 0.58      Eosinophils Absolute 0.27      Basophils Absolute 0.05     COMPREHENSIVE METABOLIC PANEL - Abnormal    Glucose 132 (*)     Sodium 136      Potassium 4.8      Chloride 105      Bicarbonate 23      Anion Gap 13      Urea Nitrogen 27 (*)     Creatinine 1.50 (*)     eGFR 34 (*)     Calcium 8.9      Albumin 3.6      Alkaline Phosphatase 73      Total Protein 6.6      AST 17      Bilirubin, Total 0.3      ALT 11     MAGNESIUM - Abnormal    Magnesium 1.36 (*)    URINALYSIS WITH REFLEX CULTURE AND MICROSCOPIC - Abnormal    Color, Urine Yellow      Appearance, Urine Clear      Specific Gravity, Urine 1.015      pH, Urine 5.0      Protein, Urine NEGATIVE      Glucose, Urine Normal      Blood, Urine NEGATIVE      Ketones, Urine NEGATIVE      Bilirubin, Urine NEGATIVE      Urobilinogen, Urine Normal      Nitrite, Urine 2+ (*)     Leukocyte Esterase, Urine 75 Matt/µL (*)    MICROSCOPIC ONLY, URINE - Abnormal    WBC, Urine 6-10 (*)     WBC Clumps, Urine RARE      RBC, Urine 1-2      Bacteria, Urine 4+ (*)    TROPONIN I, HIGH SENSITIVITY - Normal    Troponin I, High Sensitivity 11      Narrative:     Less than 99th percentile of normal range cutoff-  Female and children under 18 years old <14 ng/L; Male <21 ng/L: Negative  Repeat testing should be performed if clinically indicated.     Female and children under 18 years old 14-50 ng/L; Male 21-50 ng/L:  Consistent with possible cardiac damage and possible increased clinical   risk. Serial measurements may help to assess extent of myocardial damage.     >50 ng/L: Consistent with cardiac damage, increased clinical risk and  myocardial infarction. Serial measurements may help assess extent of   myocardial damage.      NOTE: Children less than 1 year old may have higher baseline troponin   levels and results should be interpreted in conjunction with the overall   clinical context.     NOTE: Troponin I testing is performed using a different  "  testing methodology at [facility] than at other   [facility]. Direct result comparisons should only   be made within the same method.   COAGULATION SCREEN - Normal    Protime 12.5      INR 1.1      aPTT 30      Narrative:     The APTT is no longer used for monitoring Unfractionated Heparin Therapy. For monitoring Heparin Therapy, use the Heparin Assay.   URINE CULTURE   TYPE AND SCREEN    ABO TYPE O      Rh TYPE POS      ANTIBODY SCREEN NEG     URINALYSIS WITH REFLEX CULTURE AND MICROSCOPIC    Narrative:     The following orders were created for panel order Urinalysis with Reflex Culture and Microscopic.  Procedure                               Abnormality         Status                     ---------                               -----------         ------                     Urinalysis with Reflex C...[[id number]]  Abnormal            Final result               Extra Urine Gray Tube[[id number]]                            In process                   Please view results for these tests on the individual orders.   EXTRA URINE GRAY TUBE       All other labs were within normal range or not returned as of this dictation.    EMERGENCY DEPARTMENT COURSE/MDM:   Vitals:    Vitals:    06/27/24 2230 06/28/24 0042 06/28/24 0111   BP: 111/53 124/57 123/59   BP Location: Right arm Right arm Right arm   Patient Position: Lying Lying    Pulse: 76 77 76   Resp: 18 18 15   Temp: 35.9 °C (96.6 °F) 35.6 °C (96.1 °F)    TempSrc: Temporal Temporal    SpO2: 96% 98% 98%   Weight: 104 kg (230 lb)     Height: 1.549 m (5' 1\")         I reviewed the patient's triage vitals and they are within normal range.    Due to the above findings the following was ordered basic labs include CT imaging of the head.    Workup shows stable CKD no significant electrolyte derangements with the exception of mild hypomagnesemia 2 g magnesium was ordered.  Patient's hemoglobin is improved from baseline no evidence of leukocytosis making " infectious etiology less likely.  CT imaging of the head is grossly unremarkable no mass lesion or intracranial bleed.  Chest x-ray shows no signs of pneumonia.  EKG without dysrhythmia or acute injury pattern.  Urinalysis is consistent with UTI therefore Rocephin was ordered.  Family is agreeable with hospital admission for UTI treatment.  I did have a thorough discussion with the admitting physician who is agreed to accept the patient they did take over care at time of admission order.  Patient remains hemodynamically stable.     ED Course as of 06/28/24 0303   Thu Jun 27, 2024   2230 Interpreted by the Emergency Department Attending: ECG revealed normal sinus rhythm at a rate of 76 beats per minute with GA interval 184 , QRS of 84 , QTc of 418.  No acute injury pattern. Previous EKG on June 16 revealed nonspecific changes.    [MG]   2249 Interpreted by the Emergency Department Attending: ECG revealed normal sinus rhythm at a rate of 75 beats per minute with GA interval 188 , QRS of 95 , QTc of 446.  No acute injury pattern.  No significant change. [MG]      ED Course User Index  [MG] Aditya Stovall DO         Diagnoses as of 06/28/24 0303   Altered mental status, unspecified altered mental status type   Urinary tract infection without hematuria, site unspecified       Patient was counseled regarding labs, imaging, likely diagnosis, and plan. All questions were answered.     ------------------------------------------------------------------  Information provided by EMS  Consults hospitalist  Past medical history complicating workup diabetes  Previous medical records reviewed hospital admission 6/16/2024  Shared medical decision making family would request patient be admitted for treatment feel that this is reasonable.  ------------------------------------------------------------------  ED Medications administered this visit:    Medications   magnesium sulfate IV 2 g (0 g intravenous Stopped 6/28/24 0109)    cefTRIAXone (Rocephin) IVPB 1 g (0 g intravenous Stopped 6/28/24 0132)        Final Impression:   1. Altered mental status, unspecified altered mental status type    2. Urinary tract infection without hematuria, site unspecified          Aditya Stovall DO    (Please note that portions of this note were completed with a voice recognition program.  Efforts were made to edit the dictations but occasionally words are mis-transcribed.)     Aditya Stovall DO  06/28/24 7686

## 2024-06-28 NOTE — CARE PLAN
The patient's goals for the shift include      The clinical goals for the shift include    Problem: Skin  Goal: Decreased wound size/increased tissue granulation at next dressing change  Outcome: Progressing  Goal: Participates in plan/prevention/treatment measures  Outcome: Progressing  Goal: Prevent/manage excess moisture  Outcome: Progressing  Goal: Prevent/minimize sheer/friction injuries  Outcome: Progressing  Goal: Promote/optimize nutrition  Outcome: Progressing     Problem: Pain - Adult  Goal: Verbalizes/displays adequate comfort level or baseline comfort level  Outcome: Progressing     Problem: Safety - Adult  Goal: Free from fall injury  Outcome: Progressing     Problem: Discharge Planning  Goal: Discharge to home or other facility with appropriate resources  Outcome: Progressing     Problem: Chronic Conditions and Co-morbidities  Goal: Patient's chronic conditions and co-morbidity symptoms are monitored and maintained or improved  Outcome: Progressing

## 2024-06-29 PROBLEM — R41.82 ALTERED MENTAL STATUS, UNSPECIFIED ALTERED MENTAL STATUS TYPE: Status: ACTIVE | Noted: 2024-06-29

## 2024-06-29 LAB
ANION GAP SERPL CALC-SCNC: 14 MMOL/L (ref 10–20)
BUN SERPL-MCNC: 19 MG/DL (ref 6–23)
CALCIUM SERPL-MCNC: 9.2 MG/DL (ref 8.6–10.3)
CHLORIDE SERPL-SCNC: 108 MMOL/L (ref 98–107)
CO2 SERPL-SCNC: 23 MMOL/L (ref 21–32)
CREAT SERPL-MCNC: 1.35 MG/DL (ref 0.5–1.05)
EGFRCR SERPLBLD CKD-EPI 2021: 39 ML/MIN/1.73M*2
ERYTHROCYTE [DISTWIDTH] IN BLOOD BY AUTOMATED COUNT: 16 % (ref 11.5–14.5)
GLUCOSE BLD MANUAL STRIP-MCNC: 120 MG/DL (ref 74–99)
GLUCOSE BLD MANUAL STRIP-MCNC: 130 MG/DL (ref 74–99)
GLUCOSE BLD MANUAL STRIP-MCNC: 140 MG/DL (ref 74–99)
GLUCOSE SERPL-MCNC: 114 MG/DL (ref 74–99)
HCT VFR BLD AUTO: 29.9 % (ref 36–46)
HGB BLD-MCNC: 9 G/DL (ref 12–16)
MCH RBC QN AUTO: 27.6 PG (ref 26–34)
MCHC RBC AUTO-ENTMCNC: 30.1 G/DL (ref 32–36)
MCV RBC AUTO: 92 FL (ref 80–100)
NRBC BLD-RTO: 0 /100 WBCS (ref 0–0)
PLATELET # BLD AUTO: 293 X10*3/UL (ref 150–450)
POTASSIUM SERPL-SCNC: 4.8 MMOL/L (ref 3.5–5.3)
RBC # BLD AUTO: 3.26 X10*6/UL (ref 4–5.2)
SODIUM SERPL-SCNC: 140 MMOL/L (ref 136–145)
WBC # BLD AUTO: 6.5 X10*3/UL (ref 4.4–11.3)

## 2024-06-29 PROCEDURE — 1210000001 HC SEMI-PRIVATE ROOM DAILY

## 2024-06-29 PROCEDURE — 2500000001 HC RX 250 WO HCPCS SELF ADMINISTERED DRUGS (ALT 637 FOR MEDICARE OP): Performed by: STUDENT IN AN ORGANIZED HEALTH CARE EDUCATION/TRAINING PROGRAM

## 2024-06-29 PROCEDURE — 2500000004 HC RX 250 GENERAL PHARMACY W/ HCPCS (ALT 636 FOR OP/ED): Performed by: STUDENT IN AN ORGANIZED HEALTH CARE EDUCATION/TRAINING PROGRAM

## 2024-06-29 PROCEDURE — 2500000002 HC RX 250 W HCPCS SELF ADMINISTERED DRUGS (ALT 637 FOR MEDICARE OP, ALT 636 FOR OP/ED): Performed by: STUDENT IN AN ORGANIZED HEALTH CARE EDUCATION/TRAINING PROGRAM

## 2024-06-29 PROCEDURE — 99232 SBSQ HOSP IP/OBS MODERATE 35: CPT | Performed by: INTERNAL MEDICINE

## 2024-06-29 PROCEDURE — 2500000001 HC RX 250 WO HCPCS SELF ADMINISTERED DRUGS (ALT 637 FOR MEDICARE OP): Performed by: INTERNAL MEDICINE

## 2024-06-29 PROCEDURE — 85027 COMPLETE CBC AUTOMATED: CPT | Performed by: STUDENT IN AN ORGANIZED HEALTH CARE EDUCATION/TRAINING PROGRAM

## 2024-06-29 PROCEDURE — 80048 BASIC METABOLIC PNL TOTAL CA: CPT | Performed by: STUDENT IN AN ORGANIZED HEALTH CARE EDUCATION/TRAINING PROGRAM

## 2024-06-29 PROCEDURE — 36415 COLL VENOUS BLD VENIPUNCTURE: CPT | Performed by: STUDENT IN AN ORGANIZED HEALTH CARE EDUCATION/TRAINING PROGRAM

## 2024-06-29 PROCEDURE — 82947 ASSAY GLUCOSE BLOOD QUANT: CPT

## 2024-06-29 PROCEDURE — 2500000005 HC RX 250 GENERAL PHARMACY W/O HCPCS: Performed by: INTERNAL MEDICINE

## 2024-06-29 RX ORDER — ALPRAZOLAM 0.25 MG/1
0.5 TABLET ORAL ONCE
Status: DISCONTINUED | OUTPATIENT
Start: 2024-06-29 | End: 2024-06-29

## 2024-06-29 ASSESSMENT — PAIN SCALES - GENERAL
PAINLEVEL_OUTOF10: 0 - NO PAIN
PAINLEVEL_OUTOF10: 0 - NO PAIN

## 2024-06-29 ASSESSMENT — PAIN - FUNCTIONAL ASSESSMENT: PAIN_FUNCTIONAL_ASSESSMENT: 0-10

## 2024-06-29 NOTE — CARE PLAN
The patient's goals for the shift include  rest and comfort.    The clinical goals for the shift include Comfort and safety.    Over the shift, the patient did not make progress toward the following goals. Barriers to progression include pt is restless. Recommendations to address these barriers include medications as needed to to aid relaxation.

## 2024-06-29 NOTE — PROGRESS NOTES
"Hospital Medicine Progress Note    Subjective:  Vivienne Li is a 83 y.o. female, who is hospital day 2.     Patient sleeping this am, lethargic on exam. Daughter at bedside, states pt did sleep overnight and now into the am after having a rough day yesterday. Still confused and not back to baseline.     Objective:    /67 (BP Location: Right arm, Patient Position: Lying)   Pulse 81   Temp 35.9 °C (96.6 °F) (Temporal)   Resp 18   Ht 1.549 m (5' 1\")   Wt 104 kg (230 lb)   SpO2 96%   BMI 43.46 kg/m²     Physical Exam:  General: Sleepy, no distress  HEENT: NC/AT, EOMI, clear sclera, MMM  NECK: Supple  Cardiovascular: RRR, no murmurs. S1/S2  Respiratory: CTAB, no RRW or crackles. No distress  Abdomen: Soft, ND, non-tender. No rebound or guarding. BS+  Extremities: No peripheral edema  Neurological: No focal deficits. Still rather confused  Skin: Warm and dry, no rashes  Psych: appropriate mood and affect    I personally reviewed all imaging, labs and notes/ documentation.     Assessment & Plan:    UTI  Hypomagnesemia    HTN  T2DM  Chronic anemia  CKD  Dementia  Depression    Cont rocephin, urine cs with enteric bacilli  PRNs for agitation  Hgb stable, monitor  Cont home meds  Ortho consulted for poss knee injections as pt was due to have OP, ortho recommends waiting until infection cleared and pt off antibiotics, will need to follow up OP    DVT Prophylaxis: Lovenox, SCDs  Code Status: Full Code   Disposition: Med/surg       Tammy John, DO  Internal Medicine/ Hospitalist   "

## 2024-06-29 NOTE — PROGRESS NOTES
"Vivienne Li is a 83 y.o. female on day 0 of admission presenting with UTI (urinary tract infection).    Subjective   The patient sees my partner Dr. Iraheta and is scheduled to receive bilateral knee injections for osteoarthritis in the near future.  The patient's daughter is requesting knee injections while her mother is in the hospital.       Objective     Physical Exam  Patient is sleeping and was not awakened.  Inspection of both knees reveals no significant swelling or erythema.  Last Recorded Vitals  Blood pressure 139/65, pulse 71, temperature 36.1 °C (97 °F), temperature source Temporal, resp. rate 18, height 1.549 m (5' 1\"), weight 104 kg (230 lb), SpO2 96%.  Intake/Output last 3 Shifts:  I/O last 3 completed shifts:  In: 197.9 (1.9 mL/kg) [I.V.:47.9 (0.5 mL/kg); IV Piggyback:150]  Out: 1300 (12.5 mL/kg) [Urine:1300 (0.3 mL/kg/hr)]  Weight: 104.3 kg     Relevant Results                             Assessment/Plan   Principal Problem:    UTI (urinary tract infection)    I explained to the patient's daughter that I would not recommend cortisone injections until she has resolved the urinary tract infection and is off antibiotics.  She can have the injections as an outpatient.  The daughter was understanding and will follow up with her mother at a later date.             Mariano Tyler MD      "

## 2024-06-30 VITALS
BODY MASS INDEX: 43.43 KG/M2 | HEART RATE: 67 BPM | DIASTOLIC BLOOD PRESSURE: 56 MMHG | RESPIRATION RATE: 18 BRPM | TEMPERATURE: 97.2 F | SYSTOLIC BLOOD PRESSURE: 126 MMHG | HEIGHT: 61 IN | OXYGEN SATURATION: 96 % | WEIGHT: 230 LBS

## 2024-06-30 LAB
ANION GAP SERPL CALC-SCNC: 12 MMOL/L (ref 10–20)
BACTERIA UR CULT: ABNORMAL
BUN SERPL-MCNC: 19 MG/DL (ref 6–23)
CALCIUM SERPL-MCNC: 9 MG/DL (ref 8.6–10.3)
CHLORIDE SERPL-SCNC: 106 MMOL/L (ref 98–107)
CO2 SERPL-SCNC: 25 MMOL/L (ref 21–32)
CREAT SERPL-MCNC: 1.32 MG/DL (ref 0.5–1.05)
EGFRCR SERPLBLD CKD-EPI 2021: 40 ML/MIN/1.73M*2
ERYTHROCYTE [DISTWIDTH] IN BLOOD BY AUTOMATED COUNT: 16 % (ref 11.5–14.5)
GLUCOSE BLD MANUAL STRIP-MCNC: 129 MG/DL (ref 74–99)
GLUCOSE BLD MANUAL STRIP-MCNC: 160 MG/DL (ref 74–99)
GLUCOSE SERPL-MCNC: 131 MG/DL (ref 74–99)
HCT VFR BLD AUTO: 29.3 % (ref 36–46)
HGB BLD-MCNC: 9 G/DL (ref 12–16)
MCH RBC QN AUTO: 27.8 PG (ref 26–34)
MCHC RBC AUTO-ENTMCNC: 30.7 G/DL (ref 32–36)
MCV RBC AUTO: 90 FL (ref 80–100)
NRBC BLD-RTO: 0 /100 WBCS (ref 0–0)
PLATELET # BLD AUTO: 297 X10*3/UL (ref 150–450)
POTASSIUM SERPL-SCNC: 4.3 MMOL/L (ref 3.5–5.3)
RBC # BLD AUTO: 3.24 X10*6/UL (ref 4–5.2)
SODIUM SERPL-SCNC: 139 MMOL/L (ref 136–145)
WBC # BLD AUTO: 6.6 X10*3/UL (ref 4.4–11.3)

## 2024-06-30 PROCEDURE — 2500000001 HC RX 250 WO HCPCS SELF ADMINISTERED DRUGS (ALT 637 FOR MEDICARE OP): Performed by: STUDENT IN AN ORGANIZED HEALTH CARE EDUCATION/TRAINING PROGRAM

## 2024-06-30 PROCEDURE — 2500000004 HC RX 250 GENERAL PHARMACY W/ HCPCS (ALT 636 FOR OP/ED): Performed by: INTERNAL MEDICINE

## 2024-06-30 PROCEDURE — 99238 HOSP IP/OBS DSCHRG MGMT 30/<: CPT | Performed by: INTERNAL MEDICINE

## 2024-06-30 PROCEDURE — 2500000005 HC RX 250 GENERAL PHARMACY W/O HCPCS: Performed by: INTERNAL MEDICINE

## 2024-06-30 PROCEDURE — 82947 ASSAY GLUCOSE BLOOD QUANT: CPT

## 2024-06-30 PROCEDURE — 85027 COMPLETE CBC AUTOMATED: CPT | Performed by: INTERNAL MEDICINE

## 2024-06-30 PROCEDURE — 2500000002 HC RX 250 W HCPCS SELF ADMINISTERED DRUGS (ALT 637 FOR MEDICARE OP, ALT 636 FOR OP/ED): Performed by: STUDENT IN AN ORGANIZED HEALTH CARE EDUCATION/TRAINING PROGRAM

## 2024-06-30 PROCEDURE — 2500000001 HC RX 250 WO HCPCS SELF ADMINISTERED DRUGS (ALT 637 FOR MEDICARE OP): Performed by: INTERNAL MEDICINE

## 2024-06-30 PROCEDURE — 80048 BASIC METABOLIC PNL TOTAL CA: CPT | Performed by: INTERNAL MEDICINE

## 2024-06-30 PROCEDURE — 36415 COLL VENOUS BLD VENIPUNCTURE: CPT | Performed by: INTERNAL MEDICINE

## 2024-06-30 PROCEDURE — 2500000004 HC RX 250 GENERAL PHARMACY W/ HCPCS (ALT 636 FOR OP/ED): Performed by: STUDENT IN AN ORGANIZED HEALTH CARE EDUCATION/TRAINING PROGRAM

## 2024-06-30 RX ORDER — CIPROFLOXACIN 250 MG/1
250 TABLET, FILM COATED ORAL 2 TIMES DAILY
Qty: 6 TABLET | Refills: 0 | Status: SHIPPED | OUTPATIENT
Start: 2024-06-30 | End: 2024-07-03

## 2024-06-30 RX ORDER — BISACODYL 5 MG
5 TABLET, DELAYED RELEASE (ENTERIC COATED) ORAL DAILY PRN
Qty: 7 TABLET | Refills: 0 | Status: SHIPPED | OUTPATIENT
Start: 2024-06-30 | End: 2024-07-07

## 2024-06-30 RX ORDER — SUCRALFATE 1 G/10ML
1 SUSPENSION ORAL 3 TIMES DAILY
Qty: 900 ML | Refills: 0 | Status: SHIPPED | OUTPATIENT
Start: 2024-06-30 | End: 2024-07-30

## 2024-06-30 RX ORDER — POLYETHYLENE GLYCOL 3350 17 G/17G
17 POWDER, FOR SOLUTION ORAL DAILY
Qty: 30 PACKET | Refills: 0 | Status: SHIPPED | OUTPATIENT
Start: 2024-06-30 | End: 2024-07-30

## 2024-06-30 ASSESSMENT — COGNITIVE AND FUNCTIONAL STATUS - GENERAL
MOVING FROM LYING ON BACK TO SITTING ON SIDE OF FLAT BED WITH BEDRAILS: TOTAL
EATING MEALS: A LITTLE
DRESSING REGULAR LOWER BODY CLOTHING: TOTAL
DRESSING REGULAR UPPER BODY CLOTHING: TOTAL
PERSONAL GROOMING: A LOT
TOILETING: TOTAL
HELP NEEDED FOR BATHING: TOTAL
WALKING IN HOSPITAL ROOM: TOTAL
CLIMB 3 TO 5 STEPS WITH RAILING: TOTAL
STANDING UP FROM CHAIR USING ARMS: TOTAL
DAILY ACTIVITIY SCORE: 9
TURNING FROM BACK TO SIDE WHILE IN FLAT BAD: TOTAL
MOVING TO AND FROM BED TO CHAIR: TOTAL
MOBILITY SCORE: 6

## 2024-06-30 ASSESSMENT — PAIN SCALES - GENERAL: PAINLEVEL_OUTOF10: 0 - NO PAIN

## 2024-06-30 NOTE — PROGRESS NOTES
06/30/24 1412   Discharge Planning   Living Arrangements Alone   Support Systems Children;Family members   Assistance Needed Needs assistance with adls.  does not drive.  uses walker and wheel chair to assist with ambulation   Type of Residence Private residence   Number of Stairs to Enter Residence   (elevator access)   Number of Stairs Within Residence 0   Home or Post Acute Services None   Patient expects to be discharged to: Home   Does the patient need discharge transport arranged? No     This TCC met with patient's daughter outside patient's room, introduced self and explained role.  Demographic information and insurance verified.  Patient is from home alone.   Patient needs help with ADLs and has HH aide coming to apartment.  Patient is planning to return home at discharge, and resume care with current agency.  Daughter will be staying with patient.  Daughter stated patient has home health aide coming to home and has appointment with her PCP on Tuesday.  Daughter stated patient's PCP will order therapy, which has already been discussed and arranged with her current agency.  Denies SW needs at this time.  Daughter is able to transport patient home.  Care Transitions will continue to follow.

## 2024-06-30 NOTE — CARE PLAN
Problem: Skin  Goal: Decreased wound size/increased tissue granulation at next dressing change  Outcome: Progressing  Goal: Participates in plan/prevention/treatment measures  Outcome: Progressing  Goal: Prevent/manage excess moisture  Outcome: Progressing  Goal: Prevent/minimize sheer/friction injuries  Outcome: Progressing  Goal: Promote/optimize nutrition  Outcome: Progressing     Problem: Pain - Adult  Goal: Verbalizes/displays adequate comfort level or baseline comfort level  Outcome: Progressing     Problem: Safety - Adult  Goal: Free from fall injury  Outcome: Progressing     Problem: Discharge Planning  Goal: Discharge to home or other facility with appropriate resources  Outcome: Progressing     Problem: Chronic Conditions and Co-morbidities  Goal: Patient's chronic conditions and co-morbidity symptoms are monitored and maintained or improved  Outcome: Progressing     Problem: Diabetes  Goal: Achieve decreasing blood glucose levels by end of shift  Outcome: Progressing  Goal: Increase stability of blood glucose readings by end of shift  Outcome: Progressing  Goal: Decrease in ketones present in urine by end of shift  Outcome: Progressing  Goal: Maintain electrolyte levels within acceptable range throughout shift  Outcome: Progressing  Goal: Maintain glucose levels >70mg/dl to <250mg/dl throughout shift  Outcome: Progressing  Goal: No changes in neurological exam by end of shift  Outcome: Progressing  Goal: Learn about and adhere to nutrition recommendations by end of shift  Outcome: Progressing  Goal: Vital signs within normal range for age by end of shift  Outcome: Progressing  Goal: Increase self care and/or family involovement by end of shift  Outcome: Progressing  Goal: Receive DSME education by end of shift  Outcome: Progressing   The patient's goals for the shift include      The clinical goals for the shift include patient safety

## 2024-06-30 NOTE — CARE PLAN
The clinical goals for the shift include safety and comfort      Problem: Pain - Adult  Goal: Verbalizes/displays adequate comfort level or baseline comfort level  Outcome: Progressing     Problem: Safety - Adult  Goal: Free from fall injury  Outcome: Progressing     Problem: Chronic Conditions and Co-morbidities  Goal: Patient's chronic conditions and co-morbidity symptoms are monitored and maintained or improved  Outcome: Progressing

## 2024-07-02 ENCOUNTER — HOME HEALTH ADMISSION (OUTPATIENT)
Dept: HOME HEALTH SERVICES | Facility: HOME HEALTH | Age: 84
End: 2024-07-02
Payer: MEDICARE

## 2024-07-02 ENCOUNTER — DOCUMENTATION (OUTPATIENT)
Dept: HOME HEALTH SERVICES | Facility: HOME HEALTH | Age: 84
End: 2024-07-02

## 2024-07-02 ENCOUNTER — APPOINTMENT (OUTPATIENT)
Dept: PRIMARY CARE | Facility: CLINIC | Age: 84
End: 2024-07-02
Payer: COMMERCIAL

## 2024-07-02 VITALS
SYSTOLIC BLOOD PRESSURE: 115 MMHG | DIASTOLIC BLOOD PRESSURE: 59 MMHG | HEART RATE: 69 BPM | TEMPERATURE: 97 F | OXYGEN SATURATION: 93 %

## 2024-07-02 DIAGNOSIS — M17.0 BILATERAL PRIMARY OSTEOARTHRITIS OF KNEE: Primary | ICD-10-CM

## 2024-07-02 DIAGNOSIS — R53.81 DEBILITY: ICD-10-CM

## 2024-07-02 PROCEDURE — 3078F DIAST BP <80 MM HG: CPT | Performed by: FAMILY MEDICINE

## 2024-07-02 PROCEDURE — 3074F SYST BP LT 130 MM HG: CPT | Performed by: FAMILY MEDICINE

## 2024-07-02 PROCEDURE — 1123F ACP DISCUSS/DSCN MKR DOCD: CPT | Performed by: FAMILY MEDICINE

## 2024-07-02 PROCEDURE — 1159F MED LIST DOCD IN RCRD: CPT | Performed by: FAMILY MEDICINE

## 2024-07-02 PROCEDURE — 1111F DSCHRG MED/CURRENT MED MERGE: CPT | Performed by: FAMILY MEDICINE

## 2024-07-02 PROCEDURE — 1036F TOBACCO NON-USER: CPT | Performed by: FAMILY MEDICINE

## 2024-07-02 PROCEDURE — 1158F ADVNC CARE PLAN TLK DOCD: CPT | Performed by: FAMILY MEDICINE

## 2024-07-02 PROCEDURE — 99214 OFFICE O/P EST MOD 30 MIN: CPT | Performed by: FAMILY MEDICINE

## 2024-07-02 ASSESSMENT — COLUMBIA-SUICIDE SEVERITY RATING SCALE - C-SSRS
1. IN THE PAST MONTH, HAVE YOU WISHED YOU WERE DEAD OR WISHED YOU COULD GO TO SLEEP AND NOT WAKE UP?: NO
6. HAVE YOU EVER DONE ANYTHING, STARTED TO DO ANYTHING, OR PREPARED TO DO ANYTHING TO END YOUR LIFE?: NO
2. HAVE YOU ACTUALLY HAD ANY THOUGHTS OF KILLING YOURSELF?: NO

## 2024-07-02 ASSESSMENT — PATIENT HEALTH QUESTIONNAIRE - PHQ9
1. LITTLE INTEREST OR PLEASURE IN DOING THINGS: NOT AT ALL
2. FEELING DOWN, DEPRESSED OR HOPELESS: NOT AT ALL
SUM OF ALL RESPONSES TO PHQ9 QUESTIONS 1 AND 2: 0

## 2024-07-02 ASSESSMENT — ENCOUNTER SYMPTOMS: DEPRESSION: 0

## 2024-07-02 NOTE — HH CARE COORDINATION
Home Care received a Referral for Physical Therapy. We have processed the referral for a Start of Care on 7/3-7/4/24.     If you have any questions or concerns, please feel free to contact us at [phone]. Follow the prompts, enter your five digit zip code, and you will be directed to your care team on [facility].

## 2024-07-02 NOTE — PROGRESS NOTES
Subjective   Patient ID: Vivienne Li is a 83 y.o. female who presents for Hospital Follow-up.    Discharged from hosp. recently for gi bleed. Has a uti. Needs knee intra articular steroids to knees, needs a home pt referral.         Review of Systems   Reason unable to perform ROS: debility.       Objective   /59   Pulse 69   Temp 36.1 °C (97 °F)   SpO2 93%     Physical Exam  Vitals and nursing note reviewed.   Constitutional:       Appearance: Normal appearance. She is obese. She is ill-appearing.   HENT:      Head: Normocephalic and atraumatic.      Nose: Nose normal.      Mouth/Throat:      Mouth: Mucous membranes are moist.      Pharynx: Oropharynx is clear.   Eyes:      Extraocular Movements: Extraocular movements intact.      Conjunctiva/sclera: Conjunctivae normal.      Pupils: Pupils are equal, round, and reactive to light.   Cardiovascular:      Rate and Rhythm: Normal rate and regular rhythm.      Pulses: Normal pulses.   Pulmonary:      Effort: Pulmonary effort is normal.      Breath sounds: Normal breath sounds.   Abdominal:      General: Bowel sounds are normal.      Palpations: Abdomen is soft.   Musculoskeletal:         General: Tenderness present. Normal range of motion.      Cervical back: Normal range of motion and neck supple.   Skin:     General: Skin is warm and dry.      Capillary Refill: Capillary refill takes less than 2 seconds.      Coloration: Skin is pale.   Neurological:      Mental Status: Mental status is at baseline.   Psychiatric:         Attention and Perception: She is inattentive.         Mood and Affect: Mood is depressed.         Behavior: Behavior is slowed.         Thought Content: Thought content normal.         Judgment: Judgment normal.         Assessment/Plan

## 2024-07-04 ENCOUNTER — HOME CARE VISIT (OUTPATIENT)
Dept: HOME HEALTH SERVICES | Facility: HOME HEALTH | Age: 84
End: 2024-07-04

## 2024-07-09 NOTE — DISCHARGE SUMMARY
Discharge Diagnosis  UTI (urinary tract infection)  Hypomagnesemia  Generalized weakness    Issues Requiring Follow-Up  As above    Discharge Meds     Your medication list        START taking these medications        Instructions Last Dose Given Next Dose Due   bisacodyl 5 mg EC tablet  Commonly known as: Dulcolax      Take 1 tablet (5 mg) by mouth once daily as needed for constipation for up to 7 days. Do not crush, chew, or split.       ciprofloxacin 250 mg tablet  Commonly known as: Cipro      Take 1 tablet (250 mg) by mouth 2 times a day for 3 days.              CHANGE how you take these medications        Instructions Last Dose Given Next Dose Due   amLODIPine 10 mg tablet  Commonly known as: Norvasc  What changed: when to take this      Take 1 tablet (10 mg) by mouth once daily.       polyethylene glycol 17 gram packet  Commonly known as: Glycolax, Miralax  What changed: Another medication with the same name was added. Make sure you understand how and when to take each.      Take 8.5 g by mouth once daily. Mix 1/2 cap (8.5g) into 4 ounces of fluid.       polyethylene glycol 17 gram packet  Commonly known as: Glycolax, Miralax  What changed: You were already taking a medication with the same name, and this prescription was added. Make sure you understand how and when to take each.      Take 17 g by mouth once daily.              CONTINUE taking these medications        Instructions Last Dose Given Next Dose Due   acetaminophen 325 mg tablet  Commonly known as: Tylenol      Take 2 tablets (650 mg) by mouth every 4 hours if needed for mild pain (1 - 3) or moderate pain (4 - 6) (joint pain).       aspirin 81 mg EC tablet           blood sugar diagnostic strip      400 each by combination route 4 times a day.  Use twice a day as directed. Non-insulin dependen       enalapril 10 mg tablet  Commonly known as: Vasotec      Take 1 tablet (10 mg) by mouth once daily.       ferrous sulfate 324 mg (65 mg elemental iron)  EC tablet (delayed release)      Take 1 tablet (65 mg) by mouth once a day on Monday, Wednesday, and Friday.       hydroCHLOROthiazide 25 mg tablet  Commonly known as: HYDRODiuril      Take 1 tablet (25 mg) by mouth once daily.       HYDROcodone-acetaminophen 5-325 mg tablet  Commonly known as: Norco      Take 1 tablet by mouth every 6 hours if needed for severe pain (7 - 10).       meclizine 12.5 mg tablet  Commonly known as: Antivert      Take 2 tablets (25 mg) by mouth 3 times a day as needed for dizziness.       melatonin 3 mg tablet      Take 1 tablet (3 mg) by mouth as needed at bedtime for sleep.       metFORMIN 1,000 mg tablet  Commonly known as: Glucophage      Take 1 tablet (1,000 mg) by mouth 2 times a day.       metoprolol succinate  mg 24 hr tablet  Commonly known as: Toprol-XL      Take 1 tablet (100 mg) by mouth once daily.       mirtazapine 15 mg tablet  Commonly known as: Remeron      Take 1 tablet (15 mg) by mouth once daily at bedtime.       multivitamin tablet      Take 1 tablet by mouth once daily.       pantoprazole 40 mg EC tablet  Commonly known as: ProtoNix      Take 1 tablet (40 mg) by mouth once daily in the morning. Take before meals. Do not crush, chew, or split.       rosuvastatin 40 mg tablet  Commonly known as: Crestor      Take 1 tablet (40 mg) by mouth once daily.       sertraline 50 mg tablet  Commonly known as: Zoloft      Take 1 tablet (50 mg) by mouth once daily.       sucralfate 100 mg/mL suspension  Commonly known as: Carafate      Take 10 mL (1 g) by mouth 3 times a day. One hour before meals       vortioxetine 5 mg tablet tablet  Commonly known as: Trintellix      Trintellix 5 MG Oral Tablet   Quantity: 30  Refills: 0        Start : 20-Sep-2021   Active                 Where to Get Your Medications        These medications were sent to [id number] - [place], [place] - [address] [address]      Phone: [phone]   bisacodyl 5 mg EC  tablet  ciprofloxacin 250 mg tablet  polyethylene glycol 17 gram packet  sucralfate 100 mg/mL suspension         Test Results Pending At Discharge  Pending Labs       No current pending labs.            Hospital Course   Vivienne Li is a 83 y.o. female who presented with altered mental status. Patient found to have positive UA and admitted for antibiotics. Urine cx grew pansensitive E coli, antibiotics transitioned to cipro, pt will complete 3 additional days on dc. Patient's mental status improved during admission with tx, delirium has improved. Daughter helped with translation during admission given language barrier. Patient stable for dc home, not seen by therapy yet but daughter declines any home going needs as pt already has outpatient therapy set up through daughter's employer. Hgb remained stable during admission (recent GIB).     Pertinent Physical Exam At Time of Discharge  Physical Exam  General: Awake, alert, no distress  HEENT: NC/AT, EOMI, clear sclera, MMM  NECK: Supple  Cardiovascular: RRR, no murmurs. S1/S2  Respiratory: CTAB, no RRW or crackles. No distress  Abdomen: Soft, ND, non-tender. No rebound or guarding. BS+  Extremities: No peripheral edema  Neurological: No focal deficits. Still rather confused  Skin: Warm and dry, no rashes  Psych: appropriate mood and affect    Outpatient Follow-Up  PCP follow up      Tammy John DO

## 2024-07-16 ENCOUNTER — TELEPHONE (OUTPATIENT)
Dept: PRIMARY CARE | Facility: CLINIC | Age: 84
End: 2024-07-16

## 2024-07-16 DIAGNOSIS — R41.82 ALTERED MENTAL STATUS, UNSPECIFIED ALTERED MENTAL STATUS TYPE: Primary | ICD-10-CM

## 2024-07-16 NOTE — TELEPHONE ENCOUNTER
Patient is planning to use [facility] for home health care instead of . They have employees who speak Nepalese. Could you please put in a new referral so I can fax it to [phone]?